# Patient Record
Sex: FEMALE | Race: WHITE | Employment: FULL TIME | URBAN - METROPOLITAN AREA
[De-identification: names, ages, dates, MRNs, and addresses within clinical notes are randomized per-mention and may not be internally consistent; named-entity substitution may affect disease eponyms.]

---

## 2016-11-08 LAB
ANTIBODY SCREEN, EXTERNAL: NEGATIVE
CHLAMYDIA, EXTERNAL: NEGATIVE
HBSAG, EXTERNAL: NEGATIVE
RUBELLA, EXTERNAL: NORMAL

## 2016-11-10 LAB
HIV, EXTERNAL: NON REACTIVE
T. PALLIDUM, EXTERNAL: NEGATIVE

## 2017-01-16 ENCOUNTER — ROUTINE PRENATAL (OUTPATIENT)
Dept: OBGYN CLINIC | Age: 37
End: 2017-01-16

## 2017-01-16 VITALS
DIASTOLIC BLOOD PRESSURE: 66 MMHG | HEART RATE: 89 BPM | HEIGHT: 64 IN | WEIGHT: 143 LBS | RESPIRATION RATE: 19 BRPM | BODY MASS INDEX: 24.41 KG/M2 | SYSTOLIC BLOOD PRESSURE: 106 MMHG

## 2017-01-16 DIAGNOSIS — Z34.82 SUPERVISION OF NORMAL INTRAUTERINE PREGNANCY IN MULTIGRAVIDA, SECOND TRIMESTER: Primary | ICD-10-CM

## 2017-02-14 ENCOUNTER — ROUTINE PRENATAL (OUTPATIENT)
Dept: OBGYN CLINIC | Age: 37
End: 2017-02-14

## 2017-02-14 VITALS
HEART RATE: 77 BPM | SYSTOLIC BLOOD PRESSURE: 117 MMHG | BODY MASS INDEX: 24.92 KG/M2 | RESPIRATION RATE: 18 BRPM | HEIGHT: 64 IN | DIASTOLIC BLOOD PRESSURE: 64 MMHG | WEIGHT: 146 LBS

## 2017-02-14 DIAGNOSIS — Z34.82 SUPERVISION OF NORMAL INTRAUTERINE PREGNANCY IN MULTIGRAVIDA, SECOND TRIMESTER: Primary | ICD-10-CM

## 2017-02-14 NOTE — PATIENT INSTRUCTIONS
Weeks 22 to 26 of Your Pregnancy: Care Instructions  Your Care Instructions    As you enter your 7th month of pregnancy at week 26, your baby's lungs are growing stronger and getting ready to breathe. You may notice that your baby responds to the sound of your or your partner's voice. You may also notice that your baby does less turning and twisting and more squirming or jerking. Jerking often means that your baby has the hiccups. Hiccups are perfectly normal and are only temporary. You may want to think about attending a childbirth preparation class. This is also a good time to start thinking about whether you want to have pain medicine during labor. Most pregnant women are tested for gestational diabetes between weeks 25 and 28. Gestational diabetes occurs when your blood sugar level gets too high when you're pregnant. The test is important, because you can have gestational diabetes and not know it. But the condition can cause problems for your baby. Follow-up care is a key part of your treatment and safety. Be sure to make and go to all appointments, and call your doctor if you are having problems. It's also a good idea to know your test results and keep a list of the medicines you take. How can you care for yourself at home? Ease discomfort from your baby's kicking  · Change your position. Sometimes this will cause your baby to change position too. · Take a deep breath while you raise your arm over your head. Then breathe out while you drop your arm. Do Kegel exercises to prevent urine from leaking  · You can do Kegel exercises while you stand or sit. ¨ Squeeze the same muscles you would use to stop your urine. Your belly and thighs should not move. ¨ Hold the squeeze for 3 seconds, and then relax for 3 seconds. ¨ Start with 3 seconds. Then add 1 second each week until you are able to squeeze for 10 seconds. ¨ Repeat the exercise 10 to 15 times for each session.  Do three or more sessions each day.  Ease or reduce swelling in your feet, ankles, hands, and fingers  · If your fingers are puffy, take off your rings. · Do not eat high-salt foods, such as potato chips. · Prop up your feet on a stool or couch as much as possible. Sleep with pillows under your feet. · Do not stand for long periods of time or wear tight shoes. · Wear support stockings. Where can you learn more? Go to http://louise-amari.info/. Enter G264 in the search box to learn more about \"Weeks 22 to 26 of Your Pregnancy: Care Instructions. \"  Current as of: May 30, 2016  Content Version: 11.1  © 6362-3088 [employer], [employer]. Care instructions adapted under license by [employer] (which disclaims liability or warranty for this information). If you have questions about a medical condition or this instruction, always ask your healthcare professional. [place] [address] any warranty or liability for your use of this information.

## 2017-02-14 NOTE — PROGRESS NOTES
Patient tolerating pregnancy well at this time. Denies n/v. Moderate ketones noted in urine at this time. Patient to have Glucola testing done with next visit. Handout provided.

## 2017-03-21 ENCOUNTER — TELEPHONE (OUTPATIENT)
Dept: OBGYN CLINIC | Age: 37
End: 2017-03-21

## 2017-03-21 NOTE — TELEPHONE ENCOUNTER
29 wks pt wanted to know if she can take the Tamiflu that was prescribed to her for the flu. This nurse advised patient that she can take the Tamiflu. Please advise.

## 2017-03-31 ENCOUNTER — ROUTINE PRENATAL (OUTPATIENT)
Dept: OBGYN CLINIC | Age: 37
End: 2017-03-31

## 2017-03-31 VITALS
SYSTOLIC BLOOD PRESSURE: 106 MMHG | HEIGHT: 64 IN | WEIGHT: 149.4 LBS | HEART RATE: 82 BPM | RESPIRATION RATE: 18 BRPM | DIASTOLIC BLOOD PRESSURE: 61 MMHG | BODY MASS INDEX: 25.51 KG/M2

## 2017-03-31 DIAGNOSIS — Z23 ENCOUNTER FOR IMMUNIZATION: ICD-10-CM

## 2017-03-31 DIAGNOSIS — Z3A.31 31 WEEKS GESTATION OF PREGNANCY: ICD-10-CM

## 2017-03-31 DIAGNOSIS — Z34.83 SUPERVISION OF NORMAL INTRAUTERINE PREGNANCY IN MULTIGRAVIDA, THIRD TRIMESTER: Primary | ICD-10-CM

## 2017-03-31 NOTE — PATIENT INSTRUCTIONS
Weeks 30 to 32 of Your Pregnancy: Care Instructions  Your Care Instructions    You have made it to the final months of your pregnancy. By now, your baby is really starting to look like a baby, with hair and plump skin. As you enter the final weeks of pregnancy, the reality of having a baby may start to set in. This is the time to settle on a name, get your household in order, set up a safe nursery, and find quality  if needed. Doing these things in advance will allow you to focus on caring for and enjoying your new baby. You may also want to have a tour of your hospital's labor and delivery unit to get a better idea of what to expect while you are in the hospital.  During these last months, it is very important to take good care of yourself and pay attention to what your body needs. If your doctor says it is okay for you to work, don't push yourself too hard. Use the tips provided in this care sheet to ease heartburn and care for varicose veins. If you haven't already had the Tdap shot during this pregnancy, talk to your doctor about getting it. It will help protect your  against pertussis infection. Follow-up care is a key part of your treatment and safety. Be sure to make and go to all appointments, and call your doctor if you are having problems. It's also a good idea to know your test results and keep a list of the medicines you take. How can you care for yourself at home? Pay attention to your baby's movements  · You should feel your baby move several times every day. · Your baby now turns less, and kicks and jabs more. · Your baby sleeps 20 to 45 minutes at a time and is more active at certain times of day. · If your doctor wants you to count your baby's kicks:  ¨ Empty your bladder, and lie on your side or relax in a comfortable chair. ¨ Write down your start time. ¨ Pay attention only to your baby's movements. Count any movement except hiccups.   ¨ After you have counted 10 movements, write down your stop time. ¨ Write down how many minutes it took for your baby to move 10 times. ¨ If an hour goes by and you have not recorded 10 movements, have something to eat or drink and then count for another hour. If you do not record 10 movements in either hour, call your doctor. Ease heartburn  · Eat small, frequent meals. · Do not eat chocolate, peppermint, or very spicy foods. Avoid drinks with caffeine, such as coffee, tea, and sodas. · Avoid bending over or lying down after meals. · Talk a short walk after you eat. · If heartburn is a problem at night, do not eat for 2 hours before bedtime. · Take antacids like Mylanta, Maalox, Rolaids, or Tums. Do not take antacids that have sodium bicarbonate. Care for varicose veins  · Varicose veins are blood vessels that stretch out with the extra blood during pregnancy. Your legs may ache or throb. Most varicose veins will go away after the birth. · Avoid standing for long periods of time. Sit with your legs crossed at the ankles, not the knees. · Sit with your feet propped up. · Avoid tight clothing or stockings. Wear support hose. · Exercise regularly. Try walking for at least 30 minutes a day. Where can you learn more? Go to http://louise-amari.info/. Enter L899 in the search box to learn more about \"Weeks 30 to 32 of Your Pregnancy: Care Instructions. \"  Current as of: May 30, 2016  Content Version: 11.2  © 3461-5846 [employer]. Care instructions adapted under license by [employer] (which disclaims liability or warranty for this information). If you have questions about a medical condition or this instruction, always ask your healthcare professional. [place] [address] any warranty or liability for your use of this information.

## 2017-03-31 NOTE — MR AVS SNAPSHOT
Visit Information Date & Time Provider Department Dept. Phone Encounter #  
 3/31/2017 10:30 AM MD Luis F Howe [phone] [id number] Follow-up Instructions Return in about 2 weeks (around 4/14/2017) for FOB. Follow-up and Disposition History Your Appointments 4/14/2017  3:20 PM  
OB VISIT with MD Luis F Howe ([facility]) Appt Note: 2wk fob [id number] [facility],[address] [facility] [phone]  
[facility] 31 [address] Upcoming Health Maintenance Date Due INFLUENZA AGE 9 TO ADULT 8/1/2016 PAP AKA CERVICAL CYTOLOGY 5/6/2018 Allergies as of 3/31/2017  Review Complete On: 3/31/2017 By: Emeli Greer MD  
  
 Severity Noted Reaction Type Reactions Iodine  09/02/2011   Side Effect Rash Shellfish Derived  04/21/2014    Rash Current Immunizations  Never Reviewed Name Date Influenza Vaccine 10/21/2014 Tdap 3/31/2017 11:35 AM, 11/4/2014 Not reviewed this visit You Were Diagnosed With   
  
 Codes Comments Supervision of normal intrauterine pregnancy in multigravida, third trimester    -  Primary ICD-10-CM: Z34.93 
ICD-9-CM: V22.1 31 weeks gestation of pregnancy     ICD-10-CM: Z3A.31 
ICD-9-CM: V22.2 Encounter for immunization     ICD-10-CM: M48 ICD-9-CM: V03.89 Vitals BP Pulse Resp Height(growth percentile) Weight(growth percentile) LMP  
 106/61 (BP 1 Location: Left arm, BP Patient Position: Sitting) 82 18 5' 3.5\" (1.613 m) 149 lb 6.4 oz (67.8 kg) 09/04/2016 (Exact Date) BMI OB Status Smoking Status 26.05 kg/m2 Pregnant Former Smoker BMI and BSA Data Body Mass Index Body Surface Area 26.05 kg/m 2 1.74 m 2 Preferred Pharmacy Pharmacy Name Phone [id number] [place], [address] [facility] [phone] Your Updated Medication List  
  
   
This list is accurate as of: 3/31/17  2:13 PM.  Always use your most recent med list. AMBULATORY BREAST PUMP As directed PRENATAL VITAMIN PO Take  by mouth. PROAIR HFA 90 mcg/actuation inhaler Generic drug:  albuterol TYLENOL PO Take  by mouth. We Performed the Following CBC WITH AUTOMATED DIFF [38922 CPT(R)] GLUCOSE, GESTATIONAL, 1 HR TOLERANCE [25845 CPT(R)] PA IMMUNIZ ADMIN,1 SINGLE/COMB VAC/TOXOID [id number] CPT(R)] TETANUS, DIPHTHERIA TOXOIDS AND ACELLULAR PERTUSSIS VACCINE (TDAP), IN INDIVIDS. >=7, IM [id number] CPT(R)] Follow-up Instructions Return in about 2 weeks (around 2017) for FOB. Patient Instructions Weeks 30 to 32 of Your Pregnancy: Care Instructions Your Care Instructions You have made it to the final months of your pregnancy. By now, your baby is really starting to look like a baby, with hair and plump skin. As you enter the final weeks of pregnancy, the reality of having a baby may start to set in. This is the time to settle on a name, get your household in order, set up a safe nursery, and find quality  if needed. Doing these things in advance will allow you to focus on caring for and enjoying your new baby. You may also want to have a tour of your hospital's labor and delivery unit to get a better idea of what to expect while you are in the hospital. 
During these last months, it is very important to take good care of yourself and pay attention to what your body needs. If your doctor says it is okay for you to work, don't push yourself too hard. Use the tips provided in this care sheet to ease heartburn and care for varicose veins. If you haven't already had the Tdap shot during this pregnancy, talk to your doctor about getting it. It will help protect your  against pertussis infection. Follow-up care is a key part of your treatment and safety. Be sure to make and go to all appointments, and call your doctor if you are having problems. It's also a good idea to know your test results and keep a list of the medicines you take. How can you care for yourself at home? Pay attention to your baby's movements · You should feel your baby move several times every day. · Your baby now turns less, and kicks and jabs more. · Your baby sleeps 20 to 45 minutes at a time and is more active at certain times of day. · If your doctor wants you to count your baby's kicks: 
¨ Empty your bladder, and lie on your side or relax in a comfortable chair. ¨ Write down your start time. ¨ Pay attention only to your baby's movements. Count any movement except hiccups. ¨ After you have counted 10 movements, write down your stop time. ¨ Write down how many minutes it took for your baby to move 10 times. ¨ If an hour goes by and you have not recorded 10 movements, have something to eat or drink and then count for another hour. If you do not record 10 movements in either hour, call your doctor. Ease heartburn · Eat small, frequent meals. · Do not eat chocolate, peppermint, or very spicy foods. Avoid drinks with caffeine, such as coffee, tea, and sodas. · Avoid bending over or lying down after meals. · Talk a short walk after you eat. · If heartburn is a problem at night, do not eat for 2 hours before bedtime. · Take antacids like Mylanta, Maalox, Rolaids, or Tums. Do not take antacids that have sodium bicarbonate. Care for varicose veins · Varicose veins are blood vessels that stretch out with the extra blood during pregnancy. Your legs may ache or throb. Most varicose veins will go away after the birth. · Avoid standing for long periods of time. Sit with your legs crossed at the ankles, not the knees. · Sit with your feet propped up. · Avoid tight clothing or stockings. Wear support hose. · Exercise regularly. Try walking for at least 30 minutes a day. Where can you learn more? Go to http://louise-amari.info/. Enter M432 in the search box to learn more about \"Weeks 30 to 32 of Your Pregnancy: Care Instructions. \" Current as of: May 30, 2016 Content Version: 11.2 © 7212-4557 FundRazr. Care instructions adapted under license by [employer] (which disclaims liability or warranty for this information). If you have questions about a medical condition or this instruction, always ask your healthcare professional. [address] any warranty or liability for your use of this information. Please provide this summary of care documentation to your next provider. Your primary care clinician is listed as NOT ON FILE. If you have any questions after today's visit, please call [phone].

## 2017-03-31 NOTE — PROGRESS NOTES
Patient in office for routine OB visit, would like to get TDAP vaccine. Informed consent signed at this time. This writer administered TDAP vaccine to patient, in her left deltoid. Writer expressed to patient that she may have some site soreness. Patient expresses understanding at this time.

## 2017-04-01 LAB
BASOPHILS # BLD AUTO: 0 X10E3/UL (ref 0–0.2)
BASOPHILS NFR BLD AUTO: 0 %
EOSINOPHIL # BLD AUTO: 0.1 X10E3/UL (ref 0–0.4)
EOSINOPHIL NFR BLD AUTO: 1 %
ERYTHROCYTE [DISTWIDTH] IN BLOOD BY AUTOMATED COUNT: 13.4 % (ref 12.3–15.4)
GLUCOSE 1H P 50 G GLC PO SERPL-MCNC: 161 MG/DL (ref 65–139)
HCT VFR BLD AUTO: 34.6 % (ref 34–46.6)
HGB BLD-MCNC: 11.7 G/DL (ref 11.1–15.9)
IMM GRANULOCYTES # BLD: 0.1 X10E3/UL (ref 0–0.1)
IMM GRANULOCYTES NFR BLD: 1 %
LYMPHOCYTES # BLD AUTO: 1.4 X10E3/UL (ref 0.7–3.1)
LYMPHOCYTES NFR BLD AUTO: 14 %
MCH RBC QN AUTO: 30.4 PG (ref 26.6–33)
MCHC RBC AUTO-ENTMCNC: 33.8 G/DL (ref 31.5–35.7)
MCV RBC AUTO: 90 FL (ref 79–97)
MONOCYTES # BLD AUTO: 0.4 X10E3/UL (ref 0.1–0.9)
MONOCYTES NFR BLD AUTO: 4 %
NEUTROPHILS # BLD AUTO: 8.6 X10E3/UL (ref 1.4–7)
NEUTROPHILS NFR BLD AUTO: 80 %
PLATELET # BLD AUTO: 155 X10E3/UL (ref 150–379)
RBC # BLD AUTO: 3.85 X10E6/UL (ref 3.77–5.28)
WBC # BLD AUTO: 10.6 X10E3/UL (ref 3.4–10.8)

## 2017-04-04 NOTE — PROGRESS NOTES
Patient advised of MD reviewed labs and recommendations. Patient placed on the schedule for 4/7/17 at 8:30am for the 3 hour gtt. Patient provided with instructions and verbalized understanding.

## 2017-04-07 ENCOUNTER — LAB ONLY (OUTPATIENT)
Dept: OBGYN CLINIC | Age: 37
End: 2017-04-07

## 2017-04-07 DIAGNOSIS — R73.09 ABNORMAL GLUCOSE TOLERANCE TEST: Primary | ICD-10-CM

## 2017-04-07 DIAGNOSIS — Z3A.32 32 WEEKS GESTATION OF PREGNANCY: ICD-10-CM

## 2017-04-08 LAB
GLUCOSE 1H P 100 G GLC PO SERPL-MCNC: 209 MG/DL (ref 65–179)
GLUCOSE 2H P 100 G GLC PO SERPL-MCNC: 181 MG/DL (ref 65–154)
GLUCOSE 3H P 100 G GLC PO SERPL-MCNC: 121 MG/DL (ref 65–139)
GLUCOSE P FAST SERPL-MCNC: 86 MG/DL (ref 65–94)
NOTE:, 102047: ABNORMAL

## 2017-04-13 ENCOUNTER — TELEPHONE (OUTPATIENT)
Dept: OBGYN CLINIC | Age: 37
End: 2017-04-13

## 2017-04-13 NOTE — TELEPHONE ENCOUNTER
Returned call to patient, no answer on work #. Called cell and left detailed message re: DTC and endocrinology. Advised to call office if further questions.

## 2017-04-13 NOTE — TELEPHONE ENCOUNTER
Result Notes   Notes Recorded by Brock Galarza MD on 4/9/2017 at 8:40 PM  Three hour GTT is abnormal. She needs to see Endocrine and DTC.

## 2017-04-13 NOTE — TELEPHONE ENCOUNTER
Patient aware of 3hr gtt results and the need to see Endocrine[facility]. Patient aware that a referral will be put into the system and is requesting a return call back from Reggie Calhoun. Please contact patient at [phone] ext.  11 [facility]

## 2017-04-19 ENCOUNTER — ROUTINE PRENATAL (OUTPATIENT)
Dept: OBGYN CLINIC | Age: 37
End: 2017-04-19

## 2017-04-19 VITALS
HEART RATE: 99 BPM | SYSTOLIC BLOOD PRESSURE: 109 MMHG | BODY MASS INDEX: 26.46 KG/M2 | DIASTOLIC BLOOD PRESSURE: 63 MMHG | HEIGHT: 64 IN | WEIGHT: 155 LBS | RESPIRATION RATE: 19 BRPM

## 2017-04-19 DIAGNOSIS — O24.419 GESTATIONAL DIABETES MELLITUS (GDM) IN THIRD TRIMESTER, GESTATIONAL DIABETES METHOD OF CONTROL UNSPECIFIED: Primary | ICD-10-CM

## 2017-04-21 ENCOUNTER — TELEPHONE (OUTPATIENT)
Dept: OBGYN CLINIC | Age: 37
End: 2017-04-21

## 2017-04-21 ENCOUNTER — HOSPITAL ENCOUNTER (OUTPATIENT)
Dept: DIABETES SERVICES | Age: 37
Discharge: HOME | End: 2017-04-21
Payer: COMMERCIAL

## 2017-04-21 DIAGNOSIS — O24.410 DIET CONTROLLED GESTATIONAL DIABETES MELLITUS (GDM), ANTEPARTUM: ICD-10-CM

## 2017-04-21 PROCEDURE — G0108 DIAB MANAGE TRN  PER INDIV: HCPCS | Performed by: DIETITIAN, REGISTERED

## 2017-04-21 RX ORDER — LANCETS
EACH MISCELLANEOUS
Qty: 1 PACKAGE | Refills: 4 | Status: SHIPPED | OUTPATIENT
Start: 2017-04-21 | End: 2017-05-28

## 2017-04-21 NOTE — DIABETES MGMT
04/21/17      Thank you for your kind referral. Your patient, Siddhartha Hinkle attended a gestational diabetes education session at [address] [place] [address] where the following topics were covered today:    *Describing diabetes disease process and treatment options   *Incorporating nutrition management into lifestyle   *Monitoring blood glucose and other parameters and interpreting and using the results for self-management decision   making   *Preventing, detecting and treating acute complications   * Incorporating physical activity into lifestyle   * Using medication(s) safely and for maximum therapeutic benefit   * Develop personal strategies to promote health and behavior change  * States relationship of blood glucose control in pregnancy and outcomes for mother and baby    Data from this visit:  Weight:4/21/2017 154.6 #   Blood Glucose:4/21/2017 1 Hr Post-Lunch 132  Meter given: Accu chek Eunice Plus    Goal 1: Make better food choices - Discontinue all sweet beverages including fruit juice    Your patient also chose the following way to continue to be healthy and prevent Type 2 Diabetes in the future after their pregnancy:   Try new recipes/cook at home more often    Your patient will have a follow up appointment in one week. Their next visit is scheduled for 4/27/2017      We look forward to assisting your patient in meeting their self-management goals.  If you have any questions, please do not hesitate to call the [facility] at [phone]    Sincerely, Klaudia Kumar, [address], [address] [place] [address] Manuel San  Phone: [phone] Fax: [phone]

## 2017-04-21 NOTE — TELEPHONE ENCOUNTER
Patient calling, just left diabetes treatment center and needs a rx for test strips and lancets. Patient aware that rx's were sent in to pharmacy of choice.

## 2017-04-27 ENCOUNTER — HOSPITAL ENCOUNTER (OUTPATIENT)
Dept: DIABETES SERVICES | Age: 37
Discharge: HOME | End: 2017-04-27
Payer: COMMERCIAL

## 2017-04-27 DIAGNOSIS — O24.410 DIET CONTROLLED GESTATIONAL DIABETES MELLITUS (GDM), ANTEPARTUM: ICD-10-CM

## 2017-04-27 PROCEDURE — G0108 DIAB MANAGE TRN  PER INDIV: HCPCS

## 2017-04-27 NOTE — DIABETES MGMT
04/27/17    Thank you for your kind referral. Your patient, Dick Rivers attended a follow up gestational diabetes education session at [address] [place] [address] where the following topics were covered today:    *Incorporating nutrition management into lifestyle   *Monitoring blood glucose and other parameters and interpreting and using the results for self-management decision making   * Incorporating physical activity into lifestyle   * Using medication(s) safely and for maximum therapeutic benefit   * Develop personal strategies to promote health and behavior change  * States pros and cons of breastfeeding  * Develop personal strategies to prevent Type 2 Diabetes in the future    Data from this visit:   Weight: 4/21/2017 154.6 #, 4/27/2017 150.4 #    Pt achieved her goal set at first session: Goal 1: Make better food choices - Discontinue all sweet beverages including fruit juice    Comments: Pt doing really well with meal plan. She will be seeing the endocrinologist again next week. She is planning on breastfeeding again for at least 1 year and is familiar with kcal needs. Your patient has been encouraged to follow up with the [employer] [employer] [facility] staff by phone or in the office 6-12 weeks post partum so we can assess weight loss, meal planning skills and activity levels post partum to help prevent Type 2 Diabetes in the future. We look forward to assisting your patient in meeting their self-management goals. If you have any questions, please do not hesitate to call the [employer] [facility] at [phone]    Sincerely,   Roxana Cruz.  [facility], [address] [place] [address], [place] [address]  Phone: [phone] Fax: [phone]

## 2017-05-05 ENCOUNTER — ROUTINE PRENATAL (OUTPATIENT)
Dept: OBGYN CLINIC | Age: 37
End: 2017-05-05

## 2017-05-05 VITALS
BODY MASS INDEX: 26.12 KG/M2 | HEART RATE: 70 BPM | RESPIRATION RATE: 19 BRPM | HEIGHT: 64 IN | WEIGHT: 153 LBS | SYSTOLIC BLOOD PRESSURE: 106 MMHG | DIASTOLIC BLOOD PRESSURE: 64 MMHG

## 2017-05-05 DIAGNOSIS — Z3A.36 36 WEEKS GESTATION OF PREGNANCY: ICD-10-CM

## 2017-05-05 DIAGNOSIS — O24.419 GESTATIONAL DIABETES MELLITUS (GDM) IN THIRD TRIMESTER, GESTATIONAL DIABETES METHOD OF CONTROL UNSPECIFIED: Primary | ICD-10-CM

## 2017-05-07 LAB
GP B STREP DNA SPEC QL NAA+PROBE: NEGATIVE
GRBS, EXTERNAL: NEGATIVE

## 2017-05-19 ENCOUNTER — ROUTINE PRENATAL (OUTPATIENT)
Dept: OBGYN CLINIC | Age: 37
End: 2017-05-19

## 2017-05-19 VITALS
WEIGHT: 149 LBS | RESPIRATION RATE: 19 BRPM | HEIGHT: 64 IN | DIASTOLIC BLOOD PRESSURE: 70 MMHG | SYSTOLIC BLOOD PRESSURE: 123 MMHG | BODY MASS INDEX: 25.44 KG/M2 | HEART RATE: 91 BPM

## 2017-05-19 DIAGNOSIS — O24.419 GESTATIONAL DIABETES MELLITUS (GDM) IN THIRD TRIMESTER, GESTATIONAL DIABETES METHOD OF CONTROL UNSPECIFIED: Primary | ICD-10-CM

## 2017-05-19 NOTE — PROGRESS NOTES
LIMITED OB SCAN  A SINGLE VERTEX 38W0D IUP IS SEEN. FETAL CARDIAC MOTION OBSERVED. LIMITED ANATOMY WAS VISUALIZED AND APPEARS WNL. APPROPRIATE FETAL GROWTH IS SEEN. SIZE = DATES. HC < 5%. RIKKI AND PLACENTA APPEAR WNL.     Now using insulin BID  Will get NST

## 2017-05-19 NOTE — PROGRESS NOTES
NST procedure note    Kitty Middleton is a G4 [phone],  31 y.o. female [facility] whose Patient's last menstrual period was 09/04/2016 (exact date). was on  who presents for fetal non-stress test.    She is 38w0d and was monitored for 20 minutes and the FHR was reactive, with accelerations. NST Interpretation:    FHR baseline 150 bpm, variability mild, accelerations present, decelerations Absent. Uterine contractions were present,looks like uterine irritablity. Laura Matthew was informed of the NST results and her questions were answered.     Disposition:    [x] return to clinic as scheduled next week for repeat NST; induction planned 5/26   [] send to L&D for prolonged monitoring   [] admit to L&D fo

## 2017-05-23 ENCOUNTER — ROUTINE PRENATAL (OUTPATIENT)
Dept: OBGYN CLINIC | Age: 37
End: 2017-05-23

## 2017-05-23 VITALS
BODY MASS INDEX: 26.12 KG/M2 | SYSTOLIC BLOOD PRESSURE: 115 MMHG | HEART RATE: 80 BPM | RESPIRATION RATE: 19 BRPM | HEIGHT: 64 IN | WEIGHT: 153 LBS | DIASTOLIC BLOOD PRESSURE: 64 MMHG

## 2017-05-23 DIAGNOSIS — O24.414 INSULIN CONTROLLED GESTATIONAL DIABETES MELLITUS (GDM) IN THIRD TRIMESTER: Primary | ICD-10-CM

## 2017-05-23 NOTE — PROGRESS NOTES
NST procedure note    Kitty Middleton is a G4 [phone],  31 y.o. female [facility] whose Patient's last menstrual period was 09/04/2016 (exact date). was on  who presents for fetal non-stress test.    She is 38w4d and was monitored for 20 minutes and the FHR was reactive, with accelerations. NST Interpretation:    FHR baseline 150 bpm, variability moderate, accelerations present, decelerations Absent. Uterine contractions were absent. Laura Matthew was informed of the NST results and her questions were answered.     Disposition:    [] return to clinic as scheduled   [] send to L&D for prolonged monitoring   [x] admit to L&D for induction in 3 days

## 2017-05-26 ENCOUNTER — ANESTHESIA EVENT (OUTPATIENT)
Dept: LABOR AND DELIVERY | Age: 37
End: 2017-05-26
Payer: COMMERCIAL

## 2017-05-26 ENCOUNTER — HOSPITAL ENCOUNTER (INPATIENT)
Age: 37
LOS: 2 days | Discharge: HOME | End: 2017-05-28
Attending: OBSTETRICS & GYNECOLOGY | Admitting: OBSTETRICS & GYNECOLOGY
Payer: COMMERCIAL

## 2017-05-26 ENCOUNTER — ANESTHESIA (OUTPATIENT)
Dept: LABOR AND DELIVERY | Age: 37
End: 2017-05-26
Payer: COMMERCIAL

## 2017-05-26 LAB
BASOPHILS # BLD AUTO: 0 K/UL (ref 0–0.1)
BASOPHILS # BLD: 0 % (ref 0–1)
EOSINOPHIL # BLD: 0.1 K/UL (ref 0–0.4)
EOSINOPHIL NFR BLD: 1 % (ref 0–7)
ERYTHROCYTE [DISTWIDTH] IN BLOOD BY AUTOMATED COUNT: 13.7 % (ref 11.5–14.5)
GLUCOSE BLD STRIP.AUTO-MCNC: 87 MG/DL (ref 65–100)
GLUCOSE BLD STRIP.AUTO-MCNC: 92 MG/DL (ref 65–100)
GLUCOSE BLD STRIP.AUTO-MCNC: 98 MG/DL (ref 65–100)
HCT VFR BLD AUTO: 34.6 % (ref 35–47)
HGB BLD-MCNC: 12.2 G/DL (ref 11.5–16)
LYMPHOCYTES # BLD AUTO: 17 % (ref 12–49)
LYMPHOCYTES # BLD: 1.4 K/UL (ref 0.8–3.5)
MCH RBC QN AUTO: 31 PG (ref 26–34)
MCHC RBC AUTO-ENTMCNC: 35.3 G/DL (ref 30–36.5)
MCV RBC AUTO: 87.8 FL (ref 80–99)
MONOCYTES # BLD: 0.5 K/UL (ref 0–1)
MONOCYTES NFR BLD AUTO: 5 % (ref 5–13)
NEUTS SEG # BLD: 6.4 K/UL (ref 1.8–8)
NEUTS SEG NFR BLD AUTO: 77 % (ref 32–75)
PLATELET # BLD AUTO: 143 K/UL (ref 150–400)
RBC # BLD AUTO: 3.94 M/UL (ref 3.8–5.2)
SERVICE CMNT-IMP: NORMAL
WBC # BLD AUTO: 8.4 K/UL (ref 3.6–11)

## 2017-05-26 PROCEDURE — 82962 GLUCOSE BLOOD TEST: CPT

## 2017-05-26 PROCEDURE — 65270000029 HC RM PRIVATE

## 2017-05-26 PROCEDURE — 3E0R3CZ INTRODUCE REGIONAL ANESTH IN SPINAL CANAL, PERC: ICD-10-PCS | Performed by: ANESTHESIOLOGY

## 2017-05-26 PROCEDURE — 74011000250 HC RX REV CODE- 250

## 2017-05-26 PROCEDURE — 74011250636 HC RX REV CODE- 250/636: Performed by: OBSTETRICS & GYNECOLOGY

## 2017-05-26 PROCEDURE — 76060000078 HC EPIDURAL ANESTHESIA: Performed by: ANESTHESIOLOGY

## 2017-05-26 PROCEDURE — 10907ZC DRAINAGE OF AMNIOTIC FLUID, THERAPEUTIC FROM PRODUCTS OF CONCEPTION, VIA NATURAL OR ARTIFICIAL OPENING: ICD-10-PCS | Performed by: OBSTETRICS & GYNECOLOGY

## 2017-05-26 PROCEDURE — 77030018749 HC HK AMNIO DISP DERY -A

## 2017-05-26 PROCEDURE — 75410000003 HC RECOV DEL/VAG/CSECN EA 0.5 HR: Performed by: OBSTETRICS & GYNECOLOGY

## 2017-05-26 PROCEDURE — 74011250637 HC RX REV CODE- 250/637: Performed by: OBSTETRICS & GYNECOLOGY

## 2017-05-26 PROCEDURE — 77030034850

## 2017-05-26 PROCEDURE — 75410000000 HC DELIVERY VAGINAL/SINGLE: Performed by: OBSTETRICS & GYNECOLOGY

## 2017-05-26 PROCEDURE — 36415 COLL VENOUS BLD VENIPUNCTURE: CPT | Performed by: OBSTETRICS & GYNECOLOGY

## 2017-05-26 PROCEDURE — 85025 COMPLETE CBC W/AUTO DIFF WBC: CPT | Performed by: OBSTETRICS & GYNECOLOGY

## 2017-05-26 PROCEDURE — 77030014125 HC TY EPDRL BBMI -B: Performed by: ANESTHESIOLOGY

## 2017-05-26 PROCEDURE — 74011250636 HC RX REV CODE- 250/636: Performed by: ANESTHESIOLOGY

## 2017-05-26 PROCEDURE — 75410000002 HC LABOR FEE PER 1 HR: Performed by: OBSTETRICS & GYNECOLOGY

## 2017-05-26 RX ORDER — NALOXONE HYDROCHLORIDE 0.4 MG/ML
0.4 INJECTION, SOLUTION INTRAMUSCULAR; INTRAVENOUS; SUBCUTANEOUS AS NEEDED
Status: DISCONTINUED | OUTPATIENT
Start: 2017-05-26 | End: 2017-05-28 | Stop reason: HOSPADM

## 2017-05-26 RX ORDER — ONDANSETRON 2 MG/ML
4 INJECTION INTRAMUSCULAR; INTRAVENOUS
Status: DISCONTINUED | OUTPATIENT
Start: 2017-05-26 | End: 2017-05-28 | Stop reason: HOSPADM

## 2017-05-26 RX ORDER — FENTANYL/BUPIVACAINE/NS/PF 2-1250MCG
1-16 PREFILLED PUMP RESERVOIR EPIDURAL CONTINUOUS
Status: DISCONTINUED | OUTPATIENT
Start: 2017-05-26 | End: 2017-05-26

## 2017-05-26 RX ORDER — NALOXONE HYDROCHLORIDE 0.4 MG/ML
0.4 INJECTION, SOLUTION INTRAMUSCULAR; INTRAVENOUS; SUBCUTANEOUS AS NEEDED
Status: DISCONTINUED | OUTPATIENT
Start: 2017-05-26 | End: 2017-05-26 | Stop reason: SDUPTHER

## 2017-05-26 RX ORDER — HYDROMORPHONE HYDROCHLORIDE 1 MG/ML
1 INJECTION, SOLUTION INTRAMUSCULAR; INTRAVENOUS; SUBCUTANEOUS
Status: DISCONTINUED | OUTPATIENT
Start: 2017-05-26 | End: 2017-05-26 | Stop reason: SDUPTHER

## 2017-05-26 RX ORDER — NALOXONE HYDROCHLORIDE 0.4 MG/ML
0.4 INJECTION, SOLUTION INTRAMUSCULAR; INTRAVENOUS; SUBCUTANEOUS AS NEEDED
Status: DISCONTINUED | OUTPATIENT
Start: 2017-05-26 | End: 2017-05-26 | Stop reason: HOSPADM

## 2017-05-26 RX ORDER — NALOXONE HYDROCHLORIDE 0.4 MG/ML
0.4 INJECTION, SOLUTION INTRAMUSCULAR; INTRAVENOUS; SUBCUTANEOUS ONCE
Status: DISCONTINUED | OUTPATIENT
Start: 2017-05-26 | End: 2017-05-26 | Stop reason: HOSPADM

## 2017-05-26 RX ORDER — BUPIVACAINE HYDROCHLORIDE 2.5 MG/ML
INJECTION, SOLUTION EPIDURAL; INFILTRATION; INTRACAUDAL AS NEEDED
Status: DISCONTINUED | OUTPATIENT
Start: 2017-05-26 | End: 2017-05-26 | Stop reason: HOSPADM

## 2017-05-26 RX ORDER — DIPHENHYDRAMINE HCL 25 MG
25 CAPSULE ORAL
Status: DISCONTINUED | OUTPATIENT
Start: 2017-05-26 | End: 2017-05-26 | Stop reason: SDUPTHER

## 2017-05-26 RX ORDER — OXYTOCIN/RINGER'S LACTATE 20/1000 ML
125-500 PLASTIC BAG, INJECTION (ML) INTRAVENOUS AS NEEDED
Status: DISCONTINUED | OUTPATIENT
Start: 2017-05-26 | End: 2017-05-26 | Stop reason: SDUPTHER

## 2017-05-26 RX ORDER — ONDANSETRON 2 MG/ML
4 INJECTION INTRAMUSCULAR; INTRAVENOUS
Status: DISCONTINUED | OUTPATIENT
Start: 2017-05-26 | End: 2017-05-26 | Stop reason: SDUPTHER

## 2017-05-26 RX ORDER — HYDROMORPHONE HYDROCHLORIDE 1 MG/ML
1 INJECTION, SOLUTION INTRAMUSCULAR; INTRAVENOUS; SUBCUTANEOUS
Status: DISCONTINUED | OUTPATIENT
Start: 2017-05-26 | End: 2017-05-28 | Stop reason: HOSPADM

## 2017-05-26 RX ORDER — OXYTOCIN/RINGER'S LACTATE 20/1000 ML
125-500 PLASTIC BAG, INJECTION (ML) INTRAVENOUS AS NEEDED
Status: DISCONTINUED | OUTPATIENT
Start: 2017-05-26 | End: 2017-05-28 | Stop reason: HOSPADM

## 2017-05-26 RX ORDER — INSULIN LISPRO 100 [IU]/ML
8 INJECTION, SOLUTION INTRAVENOUS; SUBCUTANEOUS
COMMUNITY
End: 2017-05-28

## 2017-05-26 RX ORDER — SODIUM CHLORIDE 0.9 % (FLUSH) 0.9 %
5-10 SYRINGE (ML) INJECTION AS NEEDED
Status: DISCONTINUED | OUTPATIENT
Start: 2017-05-26 | End: 2017-05-28 | Stop reason: HOSPADM

## 2017-05-26 RX ORDER — INSULIN LISPRO 100 [IU]/ML
10 INJECTION, SOLUTION INTRAVENOUS; SUBCUTANEOUS EVERY EVENING
COMMUNITY
End: 2017-05-28

## 2017-05-26 RX ORDER — SWAB
1 SWAB, NON-MEDICATED MISCELLANEOUS DAILY
Status: DISCONTINUED | OUTPATIENT
Start: 2017-05-27 | End: 2017-05-28 | Stop reason: HOSPADM

## 2017-05-26 RX ORDER — ACETAMINOPHEN 325 MG/1
650 TABLET ORAL
Status: DISCONTINUED | OUTPATIENT
Start: 2017-05-26 | End: 2017-05-28 | Stop reason: HOSPADM

## 2017-05-26 RX ORDER — HYDROCORTISONE ACETATE PRAMOXINE HCL 2.5; 1 G/100G; G/100G
CREAM TOPICAL AS NEEDED
Status: DISCONTINUED | OUTPATIENT
Start: 2017-05-26 | End: 2017-05-28 | Stop reason: HOSPADM

## 2017-05-26 RX ORDER — IBUPROFEN 800 MG/1
800 TABLET ORAL EVERY 8 HOURS
Status: DISCONTINUED | OUTPATIENT
Start: 2017-05-26 | End: 2017-05-28 | Stop reason: HOSPADM

## 2017-05-26 RX ORDER — IBUPROFEN 600 MG/1
600 TABLET ORAL
Qty: 30 TAB | Refills: 2 | Status: SHIPPED | OUTPATIENT
Start: 2017-05-26 | End: 2019-08-02 | Stop reason: ALTCHOICE

## 2017-05-26 RX ORDER — IBUPROFEN 600 MG/1
600 TABLET ORAL
Status: DISCONTINUED | OUTPATIENT
Start: 2017-05-26 | End: 2017-05-28 | Stop reason: HOSPADM

## 2017-05-26 RX ORDER — SODIUM CHLORIDE, SODIUM LACTATE, POTASSIUM CHLORIDE, CALCIUM CHLORIDE 600; 310; 30; 20 MG/100ML; MG/100ML; MG/100ML; MG/100ML
125 INJECTION, SOLUTION INTRAVENOUS CONTINUOUS
Status: DISCONTINUED | OUTPATIENT
Start: 2017-05-26 | End: 2017-05-28 | Stop reason: HOSPADM

## 2017-05-26 RX ORDER — HYDROCODONE BITARTRATE AND ACETAMINOPHEN 7.5; 325 MG/1; MG/1
1 TABLET ORAL
Status: DISCONTINUED | OUTPATIENT
Start: 2017-05-26 | End: 2017-05-26 | Stop reason: SDUPTHER

## 2017-05-26 RX ORDER — SIMETHICONE 80 MG
80 TABLET,CHEWABLE ORAL
Status: DISCONTINUED | OUTPATIENT
Start: 2017-05-26 | End: 2017-05-28 | Stop reason: HOSPADM

## 2017-05-26 RX ORDER — HYDROCORTISONE ACETATE PRAMOXINE HCL 2.5; 1 G/100G; G/100G
CREAM TOPICAL AS NEEDED
Status: DISCONTINUED | OUTPATIENT
Start: 2017-05-26 | End: 2017-05-26 | Stop reason: SDUPTHER

## 2017-05-26 RX ORDER — INSULIN LISPRO 100 [IU]/ML
2 INJECTION, SOLUTION INTRAVENOUS; SUBCUTANEOUS
COMMUNITY
End: 2017-05-28

## 2017-05-26 RX ORDER — DOCUSATE SODIUM 100 MG/1
100 CAPSULE, LIQUID FILLED ORAL
Status: DISCONTINUED | OUTPATIENT
Start: 2017-05-26 | End: 2017-05-26 | Stop reason: SDUPTHER

## 2017-05-26 RX ORDER — SIMETHICONE 80 MG
80 TABLET,CHEWABLE ORAL
Status: DISCONTINUED | OUTPATIENT
Start: 2017-05-26 | End: 2017-05-26 | Stop reason: SDUPTHER

## 2017-05-26 RX ORDER — DOCUSATE SODIUM 100 MG/1
100 CAPSULE, LIQUID FILLED ORAL
Status: DISCONTINUED | OUTPATIENT
Start: 2017-05-26 | End: 2017-05-28 | Stop reason: HOSPADM

## 2017-05-26 RX ORDER — HYDROCODONE BITARTRATE AND ACETAMINOPHEN 7.5; 325 MG/1; MG/1
1 TABLET ORAL
Status: DISCONTINUED | OUTPATIENT
Start: 2017-05-26 | End: 2017-05-28 | Stop reason: HOSPADM

## 2017-05-26 RX ORDER — OXYTOCIN IN 5 % DEXTROSE 30/500 ML
1-25 PLASTIC BAG, INJECTION (ML) INTRAVENOUS
Status: DISCONTINUED | OUTPATIENT
Start: 2017-05-26 | End: 2017-05-28 | Stop reason: HOSPADM

## 2017-05-26 RX ORDER — OXYCODONE AND ACETAMINOPHEN 5; 325 MG/1; MG/1
1-2 TABLET ORAL
Qty: 24 TAB | Refills: 0 | Status: SHIPPED | OUTPATIENT
Start: 2017-05-26 | End: 2017-07-07

## 2017-05-26 RX ORDER — ZOLPIDEM TARTRATE 5 MG/1
5 TABLET ORAL
Status: DISCONTINUED | OUTPATIENT
Start: 2017-05-26 | End: 2017-05-28 | Stop reason: HOSPADM

## 2017-05-26 RX ORDER — ZOLPIDEM TARTRATE 5 MG/1
5 TABLET ORAL
Status: DISCONTINUED | OUTPATIENT
Start: 2017-05-26 | End: 2017-05-26 | Stop reason: SDUPTHER

## 2017-05-26 RX ORDER — DIPHENHYDRAMINE HCL 25 MG
25 CAPSULE ORAL
Status: DISCONTINUED | OUTPATIENT
Start: 2017-05-26 | End: 2017-05-28 | Stop reason: HOSPADM

## 2017-05-26 RX ADMIN — HYDROCODONE BITARTRATE AND ACETAMINOPHEN 1 TABLET: 7.5; 325 TABLET ORAL at 20:01

## 2017-05-26 RX ADMIN — BUPIVACAINE HYDROCHLORIDE 10 ML: 2.5 INJECTION, SOLUTION EPIDURAL; INFILTRATION; INTRACAUDAL at 09:55

## 2017-05-26 RX ADMIN — SODIUM CHLORIDE, SODIUM LACTATE, POTASSIUM CHLORIDE, AND CALCIUM CHLORIDE 125 ML/HR: 600; 310; 30; 20 INJECTION, SOLUTION INTRAVENOUS at 07:16

## 2017-05-26 RX ADMIN — IBUPROFEN 800 MG: 800 TABLET, FILM COATED ORAL at 22:58

## 2017-05-26 RX ADMIN — SODIUM CHLORIDE, SODIUM LACTATE, POTASSIUM CHLORIDE, AND CALCIUM CHLORIDE 500 ML: 600; 310; 30; 20 INJECTION, SOLUTION INTRAVENOUS at 09:57

## 2017-05-26 RX ADMIN — Medication 2 MILLI-UNITS/MIN: at 07:45

## 2017-05-26 RX ADMIN — SODIUM CHLORIDE, SODIUM LACTATE, POTASSIUM CHLORIDE, AND CALCIUM CHLORIDE 1000 ML: 600; 310; 30; 20 INJECTION, SOLUTION INTRAVENOUS at 08:45

## 2017-05-26 RX ADMIN — FENTANYL 0.2 MG/100ML-BUPIV 0.125%-NACL 0.9% EPIDURAL INJ 12 ML/HR: 2/0.125 SOLUTION at 09:47

## 2017-05-26 RX ADMIN — IBUPROFEN 800 MG: 800 TABLET, FILM COATED ORAL at 14:29

## 2017-05-26 NOTE — IP AVS SNAPSHOT
J Luis Watts [address] 
[phone] Patient: Joseph Bustamante MRN: [id number] WYE:4[birth year]37[birth year]289 You are allergic to the following Allergen Reactions Iodine Rash Shellfish Derived Rash Recent Documentation Height Weight Breastfeeding? BMI OB Status Smoking Status 1.6 m 68 kg Unknown 26.57 kg/m2 Recent pregnancy Former Smoker Unresulted Labs Order Current Status SAMPLE TO BLOOD BANK In process Emergency Contacts Name Discharge Info Relation Home Work Mobile JuanMatthew DISCHARGE CAREGIVER [3] Spouse [3] [phone] RodriguezFely oconnor  Parent [1] [phone] Matthew ALANIS  Spouse [3] [phone] About your hospitalization You were admitted on:  May 26, 2017 You last received care in the:  [facility] 3 [facility] You were discharged on:  May 28, 2017 Unit phone number:  [phone] Why you were hospitalized Your primary diagnosis was:  Not on File Your diagnoses also included:  Pregnancy Providers Seen During Your Hospitalizations Provider Role Specialty Primary office phone Maximino Gomez MD Attending Provider Obstetrics & Gynecology [phone] Your Primary Care Physician (PCP) Primary Care Physician Office Phone Office Fax UNKNOWN, PROVIDER ** None ** ** None ** Follow-up Information Follow up With Details Comments Contact Info Provider Unknown   Patient not available to ask Current Discharge Medication List  
  
START taking these medications Dose & Instructions Dispensing Information Comments Morning Noon Evening Bedtime  
 ibuprofen 600 mg tablet Commonly known as:  MOTRIN Your last dose was: Your next dose is:    
   
   
 Dose:  600 mg Take 1 Tab by mouth every six (6) hours as needed for Pain. Quantity:  30 Tab Refills:  2 oxyCODONE-acetaminophen 5-325 mg per tablet Commonly known as:  PERCOCET Your last dose was: Your next dose is:    
   
   
 Dose:  1-2 Tab Take 1-2 Tabs by mouth every four (4) hours as needed for Pain. Max Daily Amount: 12 Tabs. Quantity:  24 Tab Refills:  0 CONTINUE these medications which have NOT CHANGED Dose & Instructions Dispensing Information Comments Morning Noon Evening Bedtime AMBULATORY BREAST PUMP Your last dose was: Your next dose is: As directed Quantity:  1 Device Refills:  0 PRENATAL VITAMIN PO Your last dose was: Your next dose is: Take  by mouth. Refills:  0 PROAIR HFA 90 mcg/actuation inhaler Generic drug:  albuterol Your last dose was: Your next dose is:    
   
   
  Refills:  0  
     
   
   
   
  
 TYLENOL PO Your last dose was: Your next dose is: Take  by mouth. Refills:  0 STOP taking these medications   
 glucose blood VI test strips strip Commonly known as:  ACCU-CHEK ADRIA HumaLOG 100 unit/mL injection Generic drug:  insulin lispro Lancets Misc Where to Get Your Medications Information on where to get these meds will be given to you by the nurse or doctor. ! Ask your nurse or doctor about these medications  
  ibuprofen 600 mg tablet  
 oxyCODONE-acetaminophen 5-325 mg per tablet Discharge Instructions POST DELIVERY DISCHARGE INSTRUCTIONS [address] Name: Benjie Suarez YOB: 1980 General:  
 
Diet/Diet Restrictions: 
Try to drink eight 8-ounce glasses of fluid daily (water, juices); avoid excessive caffeine intake. Meals/snacks as desired which are high in fiber and carbohydrates and low in fat and cholesterol. Medications: Physical Activity / Restrictions / Safety:  
 
Avoid heavy lifting, no more that 10 lbs. for 2-3 weeks; No driving while taking narcotic pain medication. Post  patients should not drive until pain free. No intercourse for 4-6 weeks if you had a vaginal delivery and 6 weeks if you had a  Section. Always use contraception even if you are breastfeeding. No douching or tampon use. May resume exercise in 6 weeks. Discharge Instructions/Special Treatment/Home Care Needs:  
 
Continue prenatal vitamins. Continue to use squirt bottle with warm water on your episiotomy after each bathroom use until bleeding stops. If you had a  Section and if steri-strips were applied to your incision, remove them in 7 days. If they fall off before then it's okay. If you are sent home with staples still on the incision then call the office to make an appointment within the next week to remove them. Take stool softeners (Colace) daily when you first get home and then as needed for constipation. Take them for 3-4 weeks if you had a large vaginal tear. Call your doctor for the following:  
 
Fever over 101 degrees by mouth. Vaginal bleeding heavier than a normal menstrual period or clots larger than a golf ball. Red streaks or increased swelling of legs, painful red streaks on your breast. 
Painful urination, or increased pain, redness or discharge with your incision. Pain Management:  
 
Pain Management:  
Take Acetaminophen (Tylenol) or Ibuprofen (Advil, Motrin) as directed for pain. Take perscribed narcotic meds if over the counter meds are not adequately controlling your pain. Use a warm Sitz bath 3 times daily to relieve episiotomy or hemorrhoidal discomfort. A heating pad applied to your lower abdomen can help relieve  incision pain as well as uterine cramps from delivery. For hemorrhoidal discomfort use Tucks pads and Anusol cream/Preparation H as needed and directed. Follow-Up Care:  
 
Unless instructed otherwise, make an appointment for 6 weeks after delivery. Telephone number: [phone] Signed By: Cordelia Maher MD                                                                                                   Date: 5/26/2017 Time: 4:55 PM 
 
Discharge Orders None Health system Announcement We are excited to announce that we are making your provider's discharge notes available to you in [facility]. You will see these notes when they are completed and signed by the physician that discharged you from your recent hospital stay. If you have any questions or concerns about any information you see in [place], please call the Health Information Department where you were seen or reach out to your Primary Care Provider for more information about your plan of care. General Information Please provide this summary of care documentation to your next provider. Patient Signature:  ____________________________________________________________ Date:  ____________________________________________________________  
  
[facility] Provider Signature:  ____________________________________________________________ Date:  ____________________________________________________________

## 2017-05-26 NOTE — PROGRESS NOTES
Bedside and Verbal shift change report given to Beth Thapa RN (oncoming nurse) by Erik Hilliard RN (offgoing nurse). Report given with SBAR, Kardex, Intake/Output, MAR and Recent Results.

## 2017-05-26 NOTE — ANESTHESIA PROCEDURE NOTES
Epidural Block    Start time: 5/26/2017 9:45 AM  End time: 5/26/2017 10:02 AM  Performed by: Georgiana Macedo by: Cat Todd     Pre-Procedure  Indication: labor epidural    Preanesthetic Checklist: patient identified, risks and benefits discussed, anesthesia consent, timeout performed and anesthesia consent      Epidural:   Patient position:  Seated  Prep region:  Lumbar  Prep: Betadine    Location:  L3-4    Needle and Epidural Catheter:   Needle Type:  Tuohy  Needle Gauge:  17 G  Injection Technique:  Loss of resistance using air  Attempts:  1  Catheter Size:  18 G  Events: no paresthesia and negative aspiration test    Test Dose:  Lidocaine 1.5% w/ epi and negative    Assessment:   Catheter Secured:  Tegaderm and tape  Insertion:  Uncomplicated  Patient tolerance:  Patient tolerated the procedure well with no immediate complications

## 2017-05-26 NOTE — PROGRESS NOTES
0935: Patient up to sitting position for epidural placement. Patient requesting blood sugar check due to lightheadedness. BS 90s. Dr. Armani Frias in room at this time. Patient states she feels as though she is going to vomit. BP low with first check and repeat BP. Patient helped to a L side lying position and BP rechecked in R arm. Still low. LR fluid bolus started. BP rechecked in R arm with patient supine. Patient states she is starting to feel better. BP 90s/50s. Patient repositioned again to L side lying for epidural placement. Tolerated epidural placement well. Patient repositioned to supine with head slightly elevated. BP 90s/50s. Patient states she feels fine. Will monitor.

## 2017-05-26 NOTE — L&D DELIVERY NOTE
Delivery Note    Patient delivered by .  n/a pounds n/a ounces. Apgars 9 @ one minute and 9 @ five minutes. no episiotomy, scant tear, repaired with not repaired. Third stage normal. Placenta delivered spontaneoulsy. EBL 200cc. Uterus and vagina clear of sponges.  TBLFI

## 2017-05-26 NOTE — H&P
History & Physical    Name: Mckayla Ontiveros MRN: [id number]  SSN: [id number]    YOB: 1980  Age: 31 y.o. Sex: female        Subjective:     Estimated Date of Delivery: 17  OB History      Para Term  AB TAB SAB Ectopic Multiple Living    5 2 1 0 1 0 0 1 0 2          Ms. Shweta Meyer is admitted with pregnancy at 39w0d for induction of labor. Prenatal course was complicated by gestational diabetes on insulin. Please see prenatal records for details. Past Medical History:   Diagnosis Date    Asthma     proair used as needed    Gestational diabetes     History of ectopic pregnancy      Past Surgical History:   Procedure Laterality Date    HX RIGHT SALPINGECTOMY       @  Social History     Social History    Marital status:      Spouse name: N/A    Number of children: N/A    Years of education: N/A     Occupational History    Not on file. Social History Main Topics    Smoking status: Former Smoker     Packs/day: 0.50     Types: Cigarettes    Smokeless tobacco: Never Used    Alcohol use Yes      Comment: rarely    Drug use: No    Sexual activity: Yes     Partners: Male     Birth control/ protection: Condom     Other Topics Concern    Not on file     Social History Narrative     Family History   Problem Relation Age of Onset    Breast Cancer Paternal Grandmother        Allergies   Allergen Reactions    Iodine Rash    Shellfish Derived Rash     Prior to Admission medications    Medication Sig Start Date End Date Taking? Authorizing Provider   insulin lispro (HUMALOG) 100 unit/mL injection 8 Units by SubCUTAneous route Daily (before breakfast). Yes Historical Provider   insulin lispro (HUMALOG) 100 unit/mL injection 2 Units by SubCUTAneous route three (3) times daily (with meals). Yes Historical Provider   insulin lispro (HUMALOG) 100 unit/mL injection 10 Units by SubCUTAneous route every evening.    Yes Historical Provider   glucose blood VI test strips (ACCU-CHEK ADRIA) strip Check blood sugar level 4 times per day. 4/21/17  Yes Brad Hawkins MD   Lancets misc Soft Clix Lancets. Check blood sugar levels 4 times per day. 4/21/17  Yes Brad Hawkins MD   [facility] 90 mcg/actuation inhaler  11/15/14  Yes Historical Provider   PRENATAL VITS W-CA,FE,FA,<1MG, (PRENATAL VITAMIN PO) Take  by mouth. Yes Historical Provider   AMBULATORY BREAST PUMP As directed 2/14/17   Brad Hawkins MD   ACETAMINOPHEN (TYLENOL PO) Take  by mouth. Historical Provider        Review of Systems: A comprehensive review of systems was negative except for that written in the HPI. Objective:     Vitals:  Vitals:    05/26/17 0702 05/26/17 0707 05/26/17 0712 05/26/17 0713   BP:    109/62   Pulse:    76   Resp:    12   Temp:    97.5 °F (36.4 °C)   SpO2: 98% 99% 99%    Weight:       Height:            Physical Exam:  Chest: clear  Heart: RRR  Abdomen: Gravid  CX 50/1-2/-3  Membranes:  Intact  Fetal Heart Rate: Reactive    Prenatal Labs:   Lab Results   Component Value Date/Time    Rubella, External Immune 04/21/2014    GrBStrep, External Negative 11/18/2014    HBsAg, External Negative 04/21/2014    HIV, External Negative 04/21/2014    Gonorrhea, External Negative 04/21/2014    Chlamydia, External negative 04/21/2014        Assessment/Plan:     Plan: Admit for induction of labor for diabetes. GBS negative. See prenatal labs.     Signed By:  Brad Hawkins MD     May 26, 2017

## 2017-05-26 NOTE — IP AVS SNAPSHOT
Summary of Care Report The Summary of Care report has been created to help improve care coordination. Users with access to [employer] or [address] (Web-based application) may access additional patient information including the Discharge Summary. If you are not currently a [address] user and need more information, please call the number listed below in the [place] [address] section and ask to be connected with Medical Records. Facility Information Name Address Phone [address] [place] [phone] Patient Information Patient Name Sex  Jon Bazzi ([id number]) Female 1980 Discharge Information Admitting Provider Service Area Unit Sheeba Rouse MD / [phone] [address] [facility] 3 Mother Infant / [phone] Discharge Provider Discharge Date/Time Discharge Disposition Destination (none) 2017 (Pending) AHR (none) Patient Language Language ENGLISH [13] Hospital Problems as of 2017  Reviewed: 2017  4:23 PM by Sheeba Rouse MD  
  
  
  
 Class Noted - Resolved Last Modified POA Active Problems Pregnancy  2016 - Present 2017 by Sheeba Rouse MD Unknown Entered by Sheeba Rouse MD  
  Overview Addendum 2017  2:17 PM by Brennan Ashford  for [facility] Declines genetic testing Female Flu @ 29 weeks Tdap 3/31/17 GDM 
GBS is negative IOL 17. Pt notified. Instructions given. Non-Hospital Problems as of 2017  Reviewed: 2017  4:23 PM by Sheeba Rouse MD  
 None You are allergic to the following Allergen Reactions Iodine Rash Shellfish Derived Rash Current Discharge Medication List  
  
START taking these medications Dose & Instructions Dispensing Information Comments ibuprofen 600 mg tablet Commonly known as:  MOTRIN Dose:  600 mg Take 1 Tab by mouth every six (6) hours as needed for Pain. Quantity:  30 Tab Refills:  2  
   
 oxyCODONE-acetaminophen 5-325 mg per tablet Commonly known as:  PERCOCET Dose:  1-2 Tab Take 1-2 Tabs by mouth every four (4) hours as needed for Pain. Max Daily Amount: 12 Tabs. Quantity:  24 Tab Refills:  0 CONTINUE these medications which have NOT CHANGED Dose & Instructions Dispensing Information Comments AMBULATORY BREAST PUMP As directed Quantity:  1 Device Refills:  0 PRENATAL VITAMIN PO Take  by mouth. Refills:  0 PROAIR HFA 90 mcg/actuation inhaler Generic drug:  albuterol Refills:  0  
   
 TYLENOL PO Take  by mouth. Refills:  0 STOP taking these medications Comments  
 glucose blood VI test strips strip Commonly known as:  ACCU-CHEK ADRIA HumaLOG 100 unit/mL injection Generic drug:  insulin lispro Lancets Misc Current Immunizations Name Date Influenza Vaccine 10/21/2014 Tdap 3/31/2017, 11/4/2014 Surgery Information ID Date/Time Status Primary Surgeon All Procedures Location [id number] 5/26/2017 Complete   [facility] - DO NOT SCHEDULE Follow-up Information Follow up With Details Comments Contact Info Provider Unknown   Patient not available to ask Discharge Instructions POST DELIVERY DISCHARGE INSTRUCTIONS [address] Name: Carley Haskins YOB: 1980 General:  
 
Diet/Diet Restrictions: 
Try to drink eight 8-ounce glasses of fluid daily (water, juices); avoid excessive caffeine intake. Meals/snacks as desired which are high in fiber and carbohydrates and low in fat and cholesterol. Medications:  
 
 
 
Physical Activity / Restrictions / Safety:  
 
Avoid heavy lifting, no more that 10 lbs. for 2-3 weeks;  No driving while taking narcotic pain medication. Post  patients should not drive until pain free. No intercourse for 4-6 weeks if you had a vaginal delivery and 6 weeks if you had a  Section. Always use contraception even if you are breastfeeding. No douching or tampon use. May resume exercise in 6 weeks. Discharge Instructions/Special Treatment/Home Care Needs:  
 
Continue prenatal vitamins. Continue to use squirt bottle with warm water on your episiotomy after each bathroom use until bleeding stops. If you had a  Section and if steri-strips were applied to your incision, remove them in 7 days. If they fall off before then it's okay. If you are sent home with staples still on the incision then call the office to make an appointment within the next week to remove them. Take stool softeners (Colace) daily when you first get home and then as needed for constipation. Take them for 3-4 weeks if you had a large vaginal tear. Call your doctor for the following:  
 
Fever over 101 degrees by mouth. Vaginal bleeding heavier than a normal menstrual period or clots larger than a golf ball. Red streaks or increased swelling of legs, painful red streaks on your breast. 
Painful urination, or increased pain, redness or discharge with your incision. Pain Management:  
 
Pain Management:  
Take Acetaminophen (Tylenol) or Ibuprofen (Advil, Motrin) as directed for pain. Take perscribed narcotic meds if over the counter meds are not adequately controlling your pain. Use a warm Sitz bath 3 times daily to relieve episiotomy or hemorrhoidal discomfort. A heating pad applied to your lower abdomen can help relieve  incision pain as well as uterine cramps from delivery. For hemorrhoidal discomfort use Tucks pads and Anusol cream/Preparation H as needed and directed. Follow-Up Care:  
 
Unless instructed otherwise, make an appointment for 6 weeks after delivery. Telephone number: [phone] Signed By: Daniel Freeman MD                                                                                                   Date: 5/26/2017 Time: 4:55 PM 
 
Chart Review Routing History Recipient Method Report Sent By Jax Freeman MD  
Phone: [phone] [facility] CUSTOM LAB REPORT Joseph Stoll [[id number]] 8/25/2014 12:51 PM 08/25/2014

## 2017-05-26 NOTE — PROGRESS NOTES
Bedside and Verbal shift change report given to Charbel Amaral RN (oncoming nurse) by Arnie Hurley RN (offgoing nurse). Report included the following information SBAR, Kardex, Intake/Output and Recent Results.

## 2017-05-26 NOTE — IP AVS SNAPSHOT
Current Discharge Medication List  
  
START taking these medications Dose & Instructions Dispensing Information Comments Morning Noon Evening Bedtime  
 ibuprofen 600 mg tablet Commonly known as:  MOTRIN Your last dose was: Your next dose is:    
   
   
 Dose:  600 mg Take 1 Tab by mouth every six (6) hours as needed for Pain. Quantity:  30 Tab Refills:  2  
     
   
   
   
  
 oxyCODONE-acetaminophen 5-325 mg per tablet Commonly known as:  PERCOCET Your last dose was: Your next dose is:    
   
   
 Dose:  1-2 Tab Take 1-2 Tabs by mouth every four (4) hours as needed for Pain. Max Daily Amount: 12 Tabs. Quantity:  24 Tab Refills:  0 CONTINUE these medications which have NOT CHANGED Dose & Instructions Dispensing Information Comments Morning Noon Evening Bedtime AMBULATORY BREAST PUMP Your last dose was: Your next dose is: As directed Quantity:  1 Device Refills:  0 PRENATAL VITAMIN PO Your last dose was: Your next dose is: Take  by mouth. Refills:  0 PROAIR HFA 90 mcg/actuation inhaler Generic drug:  albuterol Your last dose was: Your next dose is:    
   
   
  Refills:  0  
     
   
   
   
  
 TYLENOL PO Your last dose was: Your next dose is: Take  by mouth. Refills:  0 STOP taking these medications   
 glucose blood VI test strips strip Commonly known as:  ACCU-CHEK ADRIA HumaLOG 100 unit/mL injection Generic drug:  insulin lispro Lancets Misc Where to Get Your Medications Information on where to get these meds will be given to you by the nurse or doctor. ! Ask your nurse or doctor about these medications  
  ibuprofen 600 mg tablet oxyCODONE-acetaminophen 5-325 mg per tablet

## 2017-05-26 NOTE — DISCHARGE SUMMARY
Obstetrical Discharge Summary     Name: Rodney Gibbs MRN: [id number]  SSN: [id number]    YOB: 1980  Age: 31 y.o. Sex: female      Admit Date: 2017    Discharge Date: 2017     Admitting Physician: Ajay Cho MD     Attending Physician:  Ajay Cho MD     Admission Diagnoses: Induction;Pregnancy    Discharge Diagnoses:   Information for the patient's :  Gleda Escort Female [[id number]]   Delivery of a 5 lb 12.8 oz (2.63 kg) female infant via Vaginal, Spontaneous Delivery on 2017 at 12:23 PM  by . Apgars were 9 and 9. Additional Diagnoses:   Hospital Problems  Date Reviewed: 2017          Codes Class Noted POA    Pregnancy ICD-10-CM: Z33.1  ICD-9-CM: V22.2  2016 Unknown    Overview Addendum 2017  2:17 PM by [id number]  Declines genetic testing  Female   Flu @ 29 weeks  Tdap 3/31/17  GDM  GBS is negative  IOL 17. Pt notified. Instructions given. Lab Results   Component Value Date/Time    Rubella, External Immune 2016    GrBStrep, External Negative 2017       Hospital Course: Normal hospital course following the delivery. Condition on discharge: stable  Disposition: Home  Patient Instructions:   Current Discharge Medication List      START taking these medications    Details   oxyCODONE-acetaminophen (PERCOCET) 5-325 mg per tablet Take 1-2 Tabs by mouth every four (4) hours as needed for Pain. Max Daily Amount: 12 Tabs. Qty: 24 Tab, Refills: 0      ibuprofen (MOTRIN) 600 mg tablet Take 1 Tab by mouth every six (6) hours as needed for Pain. Qty: 30 Tab, Refills: 2         CONTINUE these medications which have NOT CHANGED    Details   PROAIR HFA 90 mcg/actuation inhaler       PRENATAL VITS W-CA,FE,FA,<1MG, (PRENATAL VITAMIN PO) Take  by mouth. AMBULATORY BREAST PUMP As directed  Qty: 1 Device, Refills: 0      ACETAMINOPHEN (TYLENOL PO) Take  by mouth.          STOP taking these medications insulin lispro (HUMALOG) 100 unit/mL injection Comments:   Reason for Stopping:         insulin lispro (HUMALOG) 100 unit/mL injection Comments:   Reason for Stopping:         insulin lispro (HUMALOG) 100 unit/mL injection Comments:   Reason for Stopping:         glucose blood VI test strips (ACCU-CHEK ADRIA) strip Comments:   Reason for Stopping:         Lancets misc Comments:   Reason for Stopping:               Reference my discharge instructions.     Follow-up Appointments   Procedures    FOLLOW UP VISIT Appointment in: 6 Weeks     Standing Status:   Standing     Number of Occurrences:   1     Order Specific Question:   Appointment in     Answer:   6 Weeks        Signed By:  Loreta Cervantes MD     May 26, 2017

## 2017-05-26 NOTE — ANESTHESIA PREPROCEDURE EVALUATION
Anesthetic History   No history of anesthetic complications            Review of Systems / Medical History  Patient summary reviewed, nursing notes reviewed and pertinent labs reviewed    Pulmonary            Asthma        Neuro/Psych   Within defined limits           Cardiovascular  Within defined limits                     GI/Hepatic/Renal  Within defined limits              Endo/Other    Diabetes         Other Findings              Physical Exam    Airway  Mallampati: II  TM Distance: 4 - 6 cm  Neck ROM: normal range of motion   Mouth opening: Normal     Cardiovascular    Rhythm: regular  Rate: normal         Dental  No notable dental hx       Pulmonary  Breath sounds clear to auscultation               Abdominal         Other Findings            Anesthetic Plan    ASA: 2  Anesthesia type: epidural            Anesthetic plan and risks discussed with: Patient

## 2017-05-26 NOTE — LACTATION NOTE
This note was copied from a baby's chart. Discussed with mother her plan for feeding. Reviewed the benefits of exclusive breast milk feeding during the hospital stay. Informed her of the risks of using formula to supplement in the first few days of life as well as the benefits of successful breast milk feeding; referred her to the Breastfeeding booklet about this information. She acknowledges understanding of information reviewed and states that it is her plan to BF her infant. Will support her choice and offer additional information as needed. Pt will successfully establish breastfeeding by feeding in response to early feeding cues   or wake every 3h, will obtain deep latch, and will keep log of feedings/output. Taught to BF at hunger cues and or q 2-3 hrs and to offer 10-20 drops of hand expressed colostrum at any non-feeds. Breast Assessment  Left Breast: Small , Medium  Left Nipple: Everted, Intact  Right Breast: Small , Medium  Right Nipple: Everted, Intact  Breast- Feeding Assessment  Attends Breast-Feeding Classes: No  Breast-Feeding Experience: Yes (1st child x 6 mo exclusive then blend fed for 1st yr, 2nd child x 18+ mo.)  Type/Quality: Attempted  Lactation Consultant Visits  Breast-Feedings: Attempted breast-feeding  Mother/Infant Observation  Mother Observation: Alignment, Breast comfortable, Recognizes feeding cues  Infant Observation: Feeding cues, Opens mouth  LATCH Documentation  Latch: Repeated attempts, hold nipple in mouth, stimulate to suck (Bonding S2S at breast, infant nursing in short bursts)  Audible Swallowing: A few with stimulation  Type of Nipple: Everted (after stimulation)  Comfort (Breast/Nipple): Soft/non-tender  Hold (Positioning): Full assist, teach one side, mother does other, staff holds  [facility] Score: 7   Insulin dependent gestational diabetic with this pregnancy.   Importance of S2S bonding at breast + offering 10-20 drops of hand expressed colostrum every 2 hours until bedtime tonight and then everry 3 hrs or until infant is effectively feeding at breast.    Hand Expression Education:  Mom taught how to manually hand express her colostrum. Emphasized the importance of providing infant with valuable colostrum as infant rests skin to skin at breast.  Aware to avoid extended periods of non-feeding. Aware to offer 10-20+ drops of colostrum every 2-3 hours until infant is latching and nursing effectively. Taught the rationale behind this low tech but highly effective evidence based practice. Biological Nurturing breastfeeding principles taught. How Biological Nurturing (BN)  promotes optimal breastfeeding (BF) sessions discussed. Mother encouraged to seek comfortable semi-reclining breastfeeding positions. Infant placed frontally along maternal contour. Primitive innate feeding reflexes/behaviors of the  discussed. BN tips and techniques shared; assisted with comfortable breastfeeding positioning.

## 2017-05-27 LAB
GLUCOSE BLD STRIP.AUTO-MCNC: 98 MG/DL (ref 65–100)
SERVICE CMNT-IMP: NORMAL

## 2017-05-27 PROCEDURE — 74011250637 HC RX REV CODE- 250/637: Performed by: OBSTETRICS & GYNECOLOGY

## 2017-05-27 PROCEDURE — 82962 GLUCOSE BLOOD TEST: CPT

## 2017-05-27 PROCEDURE — 77030018846 HC SOL IRR STRL H20 ICUM -A

## 2017-05-27 PROCEDURE — 65270000029 HC RM PRIVATE

## 2017-05-27 RX ORDER — HYDROCORTISONE 25 MG/G
CREAM TOPICAL
Status: DISCONTINUED | OUTPATIENT
Start: 2017-05-27 | End: 2017-05-28 | Stop reason: HOSPADM

## 2017-05-27 RX ADMIN — HYDROCODONE BITARTRATE AND ACETAMINOPHEN 1 TABLET: 7.5; 325 TABLET ORAL at 21:42

## 2017-05-27 RX ADMIN — HYDROCORTISONE: 25 CREAM TOPICAL at 18:15

## 2017-05-27 RX ADMIN — HYDROCODONE BITARTRATE AND ACETAMINOPHEN 1 TABLET: 7.5; 325 TABLET ORAL at 00:46

## 2017-05-27 RX ADMIN — HYDROCODONE BITARTRATE AND ACETAMINOPHEN 1 TABLET: 7.5; 325 TABLET ORAL at 16:03

## 2017-05-27 RX ADMIN — IBUPROFEN 800 MG: 800 TABLET, FILM COATED ORAL at 06:15

## 2017-05-27 RX ADMIN — HYDROCODONE BITARTRATE AND ACETAMINOPHEN 1 TABLET: 7.5; 325 TABLET ORAL at 06:15

## 2017-05-27 RX ADMIN — HYDROCODONE BITARTRATE AND ACETAMINOPHEN 1 TABLET: 7.5; 325 TABLET ORAL at 11:47

## 2017-05-27 RX ADMIN — IBUPROFEN 800 MG: 800 TABLET, FILM COATED ORAL at 23:45

## 2017-05-27 RX ADMIN — IBUPROFEN 800 MG: 800 TABLET, FILM COATED ORAL at 15:54

## 2017-05-27 RX ADMIN — HYDROCORTISONE: 25 CREAM TOPICAL at 21:48

## 2017-05-27 NOTE — LACTATION NOTE
This note was copied from a baby's chart. Pt will successfully establish breastfeeding by feeding in response to early feeding cues   or wake every 3h, will obtain deep latch, and will keep log of feedings/output. Taught to BF at hunger cues and or q 2-3 hrs and to offer 10-20 drops of hand expressed colostrum at any non-feeds. Breast Assessment  Left Breast: Small , Medium, Engorged  Left Nipple: Everted, Intact  Right Breast: Small , Medium  Right Nipple: Everted, Intact, Tender (Lanolin provided as mom requested)  Breast- Feeding Assessment  Attends Breast-Feeding Classes: No  Breast-Feeding Experience: Yes  Breast Trauma/Surgery: No  Type/Quality: Good (Effectively feeding at the left breast)  Lactation Consultant Visits  Breast-Feedings: Good   Mother/Infant Observation  Mother Observation: Alignment, Recognizes feeding cues, Lets baby end feeding, Nipple round on release  Infant Observation: Feeding cues, Latches nipple and aereolae, Lips flanged, lower, Lips flanged, upper, Opens mouth, Rhythmic suck, Relaxed after feeding  LATCH Documentation  Latch: Grasps breast, tongue down, lips flanged, rhythmic sucking  Audible Swallowing: Spontaneous and intermittent (24 hours old)  Type of Nipple: Everted (after stimulation)  Comfort (Breast/Nipple): Filling, red/small blisters/bruises, mild/mod discomfort  Hold (Positioning): No assist from staff, mother able to position/hold infant  LATCH Score: 9   Reviewed breastfeeding basics:  How milk is made and normal  breastfeeding behaviors discussed. Supply and demand,  stomach size, early feeding cues, skin to skin bonding with comfortable positioning and baby led latch-on reviewed. How to identify signs of successful breastfeeding sessions reviewed; education on assymetrical latch, signs of effective latching vs shallow, in-effective latching, normal  feeding frequency and duration and expected infant output discussed.   Normal course of breastfeeding discussed including the AAP's recommendation that children receive exclusive breast milk feedings for the first six months of life with breast milk feedings to continue through the first year of life and/or beyond as complimentary table foods are added. Breastfeeding Booklet and Warm line information provided with discussion. Discussed typical  weight loss and the importance of pediatrician appointment within 24-48 hours of discharge, at 2 weeks of life and normalcy of requesting pediatric weight checks as needed in between visits. Biological Nurturing breastfeeding principles taught. How Biological Nurturing (BN)  promotes optimal breastfeeding (BF) sessions discussed. Mother encouraged to seek comfortable semi-reclining breastfeeding positions. Infant placed frontally along maternal contour. Primitive innate feeding reflexes/behaviors of the  discussed. BN tips and techniques shared; assisted with comfortable breastfeeding positioning.

## 2017-05-27 NOTE — ROUTINE PROCESS
Bedside and Verbal shift change report given to [address] RN (oncoming nurse) by Kenya Sandoval RN (offgoing nurse). Report included the following information SBAR, Kardex, Intake/Output, MAR and Accordion.

## 2017-05-27 NOTE — PROGRESS NOTES
Bedside shift change report given to Violet Strong RN (oncoming nurse) by Xochilt Tejada RN (offgoing nurse). Report included the following information SBAR, Kardex, Intake/Output and MAR.

## 2017-05-27 NOTE — PROGRESS NOTES
PPD 1    The patient is without complaints. Passing minimal lochia and is voiding well.   Patient Vitals for the past 12 hrs:   Temp Pulse Resp BP   17 0742 98.1 °F (36.7 °C) 62 16 119/60   17 2205 98.1 °F (36.7 °C) 74 16 117/74       Abdomen: firm, non tender uterus below the umbilicus  Ext: no edema, no calf tenderness    A pos/ rub imm / rpr nr    Ass/Plan: PPD 1 s/p  doing well  -routine post partum care  -received TDAP during pregnancy  -baby girl/ breast feeding

## 2017-05-28 VITALS
RESPIRATION RATE: 18 BRPM | BODY MASS INDEX: 26.58 KG/M2 | WEIGHT: 150 LBS | OXYGEN SATURATION: 97 % | HEIGHT: 63 IN | HEART RATE: 63 BPM | SYSTOLIC BLOOD PRESSURE: 134 MMHG | TEMPERATURE: 97.8 F | DIASTOLIC BLOOD PRESSURE: 63 MMHG

## 2017-05-28 PROCEDURE — 74011250637 HC RX REV CODE- 250/637: Performed by: OBSTETRICS & GYNECOLOGY

## 2017-05-28 RX ADMIN — HYDROCODONE BITARTRATE AND ACETAMINOPHEN 1 TABLET: 7.5; 325 TABLET ORAL at 03:22

## 2017-05-28 RX ADMIN — IBUPROFEN 800 MG: 800 TABLET, FILM COATED ORAL at 08:37

## 2017-05-28 RX ADMIN — HYDROCODONE BITARTRATE AND ACETAMINOPHEN 1 TABLET: 7.5; 325 TABLET ORAL at 08:37

## 2017-05-28 RX ADMIN — HYDROCORTISONE: 25 CREAM TOPICAL at 08:36

## 2017-05-28 NOTE — PROGRESS NOTES
Post-Partum Day Number 2    Progress note post vaginal delivery    Patient doing well post-partum without significant complaint. Voiding without difficulty, normal lochia, positive flatus. Vitals:  Patient Vitals for the past 8 hrs:   BP Temp Pulse Resp   17 0653 134/63 97.8 °F (36.6 °C) 63 18     Temp (24hrs), Av.8 °F (36.6 °C), Min:97.4 °F (36.3 °C), Max:98.2 °F (36.8 °C)      Vital signs stable, afebrile. Exam:  Patient without distress. Abdomen soft, fundus firm,  nontender               Perineum with normal lochia noted. Lower extremities are negative for swelling, cords or tenderness. Labs: No results found for this or any previous visit (from the past 24 hour(s)). Assessment:     Status post: vaginal delivery. Doing well postpartum day 2. Plan:     Routine postpartum care. Plan discharge for today with follow up in our office in 6 weeks. See discharge summary.

## 2017-05-28 NOTE — PROGRESS NOTES
Mother discharged to home with parents. Discharge teaching done at bedside and all questions answered. Prescriptions given to patient. Vitals within normal limits.

## 2017-05-28 NOTE — DISCHARGE SUMMARY
Obstetrical Discharge Summary     Name: Mckayla Ontiveros MRN: [id number]  SSN: [id number]    YOB: 1980  Age: 31 y.o. Sex: female      Admit Date: 2017    Discharge Date: 2017     Admitting Physician: Lena Oconnor MD     Attending Physician:  Lena Oconnor MD     Admission Diagnoses: Induction;Pregnancy    Discharge Diagnoses:   Information for the patient's :  Gerard Eden Female [[id number]]   Delivery of a 5 lb 12.8 oz (2.63 kg) female infant via Vaginal, Spontaneous Delivery on 2017 at 12:23 PM  by . Apgars were 9 and 9. Additional Diagnoses:   Hospital Problems  Date Reviewed: 2017          Codes Class Noted POA    Pregnancy ICD-10-CM: Z33.1  ICD-9-CM: V22.2  2016 Unknown    Overview Addendum 2017  2:17 PM by [id number]  Declines genetic testing  Female   Flu @ 29 weeks  Tdap 3/31/17  GDM  GBS is negative  IOL 17. Pt notified. Instructions given. Lab Results   Component Value Date/Time    Rubella, External Immune 2016    GrBStrep, External Negative 2017       Immunization(s):   Immunization History   Administered Date(s) Administered    Influenza Vaccine 10/21/2014    Tdap 2014, 2017        Hospital Course: Normal hospital course following the delivery. The patient was released to her home in good condition. Patient Instructions:     Reference my discharge instructions.       Follow-up Appointments   Procedures    FOLLOW UP VISIT Appointment in: 6 Weeks     Standing Status:   Standing     Number of Occurrences:   1     Order Specific Question:   Appointment in     Answer:   6 Weeks        Signed By:  Nael Barrera MD     May 28, 2017

## 2017-05-28 NOTE — DISCHARGE INSTRUCTIONS
[facility]  Name: Rodney Gibbs  YOB: 1980      General:     Diet/Diet Restrictions:  Try to drink eight 8-ounce glasses of fluid daily (water, juices); avoid excessive caffeine intake. Meals/snacks as desired which are high in fiber and carbohydrates and low in fat and cholesterol. Medications:         Physical Activity / Restrictions / Safety:     Avoid heavy lifting, no more that 10 lbs. for 2-3 weeks; No driving while taking narcotic pain medication. Post  patients should not drive until pain free. No intercourse for 4-6 weeks if you had a vaginal delivery and 6 weeks if you had a  Section. Always use contraception even if you are breastfeeding. No douching or tampon use. May resume exercise in 6 weeks. Discharge Instructions/Special Treatment/Home Care Needs:     Continue prenatal vitamins. Continue to use squirt bottle with warm water on your episiotomy after each bathroom use until bleeding stops. If you had a  Section and if steri-strips were applied to your incision, remove them in 7 days. If they fall off before then it's okay. If you are sent home with staples still on the incision then call the office to make an appointment within the next week to remove them. Take stool softeners (Colace) daily when you first get home and then as needed for constipation. Take them for 3-4 weeks if you had a large vaginal tear. Call your doctor for the following:     Fever over 101 degrees by mouth. Vaginal bleeding heavier than a normal menstrual period or clots larger than a golf ball. Red streaks or increased swelling of legs, painful red streaks on your breast.  Painful urination, or increased pain, redness or discharge with your incision. Pain Management:     Pain Management:   Take Acetaminophen (Tylenol) or Ibuprofen (Advil, Motrin) as directed for pain.   Take perscribed narcotic meds if over the counter meds are not adequately controlling your pain. Use a warm Sitz bath 3 times daily to relieve episiotomy or hemorrhoidal discomfort. A heating pad applied to your lower abdomen can help relieve  incision pain as well as uterine cramps from delivery. For hemorrhoidal discomfort use Tucks pads and Anusol cream/Preparation H as needed and directed. Follow-Up Care:     Unless instructed otherwise, make an appointment for 6 weeks after delivery.   Telephone number: [phone]    Signed By: Pat Denson MD                                                                                                   Date: 2017 Time: 4:55 PM

## 2017-05-28 NOTE — ROUTINE PROCESS
Bedside and Verbal shift change report given to Nakul Cedillo RN (oncoming nurse) by Holli Trinh RN (offgoing nurse). Report included the following information SBAR, Kardex, Intake/Output, MAR and Accordion.

## 2017-07-07 ENCOUNTER — OFFICE VISIT (OUTPATIENT)
Dept: OBGYN CLINIC | Age: 37
End: 2017-07-07

## 2017-07-07 NOTE — PROGRESS NOTES
Postpartum evaluation    Eliud Saldivar is a 30 y.o. female who presents for a postpartum exam.     She is now six weeks post normal spontaneous vaginal delivery. Her baby is doing well. She has had no menses since delivery. She has had the following significant problems since her delivery: none    The patient is breast feeding without difficulty. The patient would like to use birth control. She is currently taking: no medications. Taking Prenatals    She is due for her next AE 05/2018. There were no vitals taken for this visit.     PHYSICAL EXAMINATION    Constitutional  · Appearance: well-nourished, well developed, alert, in no acute distress    HENT  · Head and Face: appears normal    Neck  · Inspection/Palpation: normal appearance, no masses or tenderness  · Lymph Nodes: no lymphadenopathy present  · Thyroid: gland size normal, nontender, no nodules or masses present on palpation    Gastrointestinal  · Abdominal Examination: abdomen non-tender to palpation, normal bowel sounds, no masses present  · Liver and spleen: no hepatomegaly present, spleen not palpable  · Hernias: no hernias identified    Genitourinary  · External Genitalia: normal appearance for age, no discharge present, no tenderness present, no inflammatory lesions present, no masses present, no atrophy present  · Vagina: normal vaginal vault without central or paravaginal defects, no discharge present, no inflammatory lesions present, no masses present  · Bladder: non-tender to palpation  · Urethra: appears normal  · Cervix: normal   · Uterus: normal size, shape and consistency  · Adnexa: no adnexal tenderness present, no adnexal masses present  · Perineum: perineum within normal limits, no evidence of trauma, no rashes or skin lesions present  · Anus: anus within normal limits, no hemorrhoids present  · Inguinal Lymph Nodes: no lymphadenopathy present    Skin  · General Inspection: no rash, no lesions identified    Neurologic/Psychiatric  · Mental Status:  · Orientation: grossly oriented to person, place and time  · Mood and Affect: mood normal, affect appropriate    Assessment:  Normal postpartum check    Plan:  RTO for AE. Rx for contraception: given info on IUDs and Nexplanon  She will see Endocrine in 2 weeks.

## 2017-07-21 ENCOUNTER — OFFICE VISIT (OUTPATIENT)
Dept: OBGYN CLINIC | Age: 37
End: 2017-07-21

## 2017-07-21 DIAGNOSIS — Z30.430 ENCOUNTER FOR IUD INSERTION: Primary | ICD-10-CM

## 2017-07-21 DIAGNOSIS — Z32.02 NEGATIVE PREGNANCY TEST: ICD-10-CM

## 2017-07-21 LAB
HCG URINE, QL. (POC): NEGATIVE
VALID INTERNAL CONTROL?: YES

## 2017-07-21 NOTE — PATIENT INSTRUCTIONS
Intrauterine Device (IUD) Insertion: Care Instructions  Your Care Instructions    The intrauterine device (IUD) is a very effective method of birth control. It is a small, plastic, T-shaped device that contains copper or hormones. The doctor inserts the IUD into your uterus. A plastic string tied to the end of the IUD hangs down through the cervix into the vagina. There are two types of IUDs. The copper IUD is effective for up to 10 years. The hormonal IUD is effective for either 3 years or 5 years, depending on which IUD is used. The hormonal IUD also reduces menstrual bleeding and cramping. Both types of IUD damage or kill the man's sperm. This means that the woman's egg does not join with the sperm. IUDs also change the lining of the uterus so that the egg does not lodge there. The IUD is most likely to work well for women who have been pregnant before. Some women who have never been pregnant have more trouble keeping the IUD in the uterus. They also may have more pain and cramping after insertion. Follow-up care is a key part of your treatment and safety. Be sure to make and go to all appointments, and call your doctor if you are having problems. It's also a good idea to know your test results and keep a list of the medicines you take. How can you care for yourself at home? · You may experience some mild cramping and light bleeding (spotting) for 1 or 2 days. Use a hot water bottle or a heating pad set on low on your belly for pain. · Take an over-the-counter pain medicine, such as acetaminophen (Tylenol), ibuprofen (Advil, Motrin), and naproxen (Aleve) if needed. Read and follow all instructions on the label. · Do not take two or more pain medicines at the same time unless the doctor told you to. Many pain medicines have acetaminophen, which is Tylenol. Too much acetaminophen (Tylenol) can be harmful. · Check the string of your IUD after every period.  To do this, insert a finger into your vagina and feel for the cervix, which is at the top of the vagina and feels harder than the rest of your vagina. You should be able to feel the thin, plastic string coming out of the opening of your cervix. If you cannot feel the string, use another form of birth control and make an appointment with your doctor to have the string checked. · If the IUD comes out, save it and call your doctor. Be sure to use another form of birth control while the IUD is out. · Use latex condoms to protect against sexually transmitted infections (STIs), such as gonorrhea and chlamydia. An IUD does not protect you from STIs. Having one sex partner (who does not have STIs and does not have sex with anyone else) is a good way to avoid STIs. When should you call for help? Call 911 anytime you think you may need emergency care. For example, call if:  · You passed out (lost consciousness). · You have sudden, severe pain in your belly or pelvis. Call your doctor now or seek immediate medical care if:  · You have new belly or pelvic pain. · You have severe vaginal bleeding. This means that you are soaking through your usual pads or tampons each hour for 2 or more hours. · You are dizzy or lightheaded, or you feel like you may faint. · You have a fever and pelvic pain or vaginal discharge. · You have pelvic pain that is getting worse. Watch closely for changes in your health, and be sure to contact your doctor if:  · You cannot feel the string, or the IUD comes out. · You feel sick to your stomach, or you vomit. · You think you may be pregnant. Where can you learn more? Go to http://louise-amari.info/. Enter D937 in the search box to learn more about \"Intrauterine Device (IUD) Insertion: Care Instructions. \"  Current as of: March 16, 2017  Content Version: 11.3  © 6436-1153 [employer].  Care instructions adapted under license by [employer] (which disclaims liability or warranty for this information). If you have questions about a medical condition or this instruction, always ask your healthcare professional. [place] [address] any warranty or liability for your use of this information.

## 2017-07-21 NOTE — PROGRESS NOTES
[facility]  OFFICE PROCEDURE PROGRESS NOTE        Chart reviewed for the following:   Loly NIX, have reviewed the History, Physical and updated the Allergic reactions for [address] performed immediately prior to start of procedure:   Loly NIX, have performed the following reviews on Marina Joya prior to the start of the procedure:            * Patient was identified by name and date of birth   * Agreement on procedure being performed was verified  * Risks and Benefits explained to the patient  * Consent was signed and verified     Time: 1:20pm    Date of procedure: 2017    Procedure performed by: Loren Tracy MD    Provider assisted by: Ramu Lopez MA    Patient assisted by: self    How tolerated by patient: tolerated the procedure well with no complications    Post Procedural Pain Scale: 0 - No Hurt    Comments: none      INSERTION----------------------------------------- Indications:  Marina Joya is a ,  30 y.o. female [facility] whose No LMP recorded. was on  and was normal in duration and amount of flow. who presents for insertion of an IUD. The risks, benefits and alternatives of IUD insertion were discussed in detail at last visit. She also has reviewed Mirena information. She has elected to proceed with the insertion today and she states she has no further questions. A urine pregnancy test was negative     Procedure: The pelvic exam revealed normal external genitalia. On bimanual exam the uterus was midposition and normal in size with no tenderness present. A speculum was inserted into the vagina and the cervix was visualized. The cervix was prepped with zephiran solution. The anterior lip of the cervix was grasped with a single toothed tenaculum. The uterus was sounded with a Mcgee sound to 8 centimeters. A Mirena was then inserted without difficulty. The string was cut to 3 centimeters. She experienced a moderate  amount of cramping. Post Procedure Status: She tolerated the procedure with moderate discomfort. The patient was observed for 15 minutes after the insertion. There were no complications. Patient was discharged in stable condition. The patient received Mirena lot number [id number]. Advised to RTO in 6 weeks for an US and to use condoms in the meantime.

## 2017-09-29 ENCOUNTER — OFFICE VISIT (OUTPATIENT)
Dept: OBGYN CLINIC | Age: 37
End: 2017-09-29

## 2017-09-29 VITALS — WEIGHT: 145 LBS | BODY MASS INDEX: 25.69 KG/M2 | RESPIRATION RATE: 19 BRPM | HEIGHT: 63 IN

## 2017-09-29 DIAGNOSIS — Z30.431 IUD CHECK UP: Primary | ICD-10-CM

## 2017-09-29 NOTE — PROGRESS NOTES
This is a follow-up visit for Traci Perez is a [phone],  30 y.o. female [facility] whose No LMP recorded. was on . She had an Mirena IUD placed six weeks ago. Since the IUD placement, the patient has not had any unusual complaints. She has had some mild non-menstrual bleeding. She describes having a light amount of blood-tinged discharge which has occurred off and on since insertion of the Mirena. She has not significant pelvic pain. She has had no fever. Associated signs and symptoms: she denies dyspareunia, expulsion, heavy bleeding, increased pain, fever, and pelvic pain. Ultrasound was done today and revealed appropriate placement of the IUD in the endometrial cavity. Past Medical History:   Diagnosis Date    Asthma     proair used as needed    Gestational diabetes     History of ectopic pregnancy      Past Surgical History:   Procedure Laterality Date    HX RIGHT SALPINGECTOMY       Social History     Occupational History    Not on file. Social History Main Topics    Smoking status: Former Smoker     Packs/day: 0.50     Types: Cigarettes    Smokeless tobacco: Never Used    Alcohol use Yes      Comment: rarely    Drug use: No    Sexual activity: Yes     Partners: Male     Birth control/ protection: Condom     Family History   Problem Relation Age of Onset    Breast Cancer Paternal Grandmother        Allergies   Allergen Reactions    Iodine Rash    Shellfish Derived Rash     Prior to Admission medications    Medication Sig Start Date End Date Taking? Authorizing Provider   ibuprofen (MOTRIN) 600 mg tablet Take 1 Tab by mouth every six (6) hours as needed for Pain. 5/26/17   James Holliday MD   AMBULATORY BREAST PUMP As directed 2/14/17   James Holliday MD   [facility] 90 mcg/actuation inhaler  11/15/14   Historical Provider   PRENATAL VITS W-CA,FE,FA,<1MG, (PRENATAL VITAMIN PO) Take  by mouth. Historical Provider   ACETAMINOPHEN (TYLENOL PO) Take  by mouth. Historical Provider        Review of Systems: History obtained from the patient  Constitutional: negative for weight loss, fever, night sweats  Breast: negative for breast lumps, nipple discharge, galactorrhea  GI: negative for change in bowel habits, abdominal pain, black or bloody stools  : negative for frequency, dysuria, hematuria, vaginal discharge  MSK: negative for back pain, joint pain, muscle pain  Skin: negative for itching, rash, hives  Psych: negative for anxiety, depression, change in mood      Objective:  Visit Vitals    Resp 19    Ht 5' 3\" (1.6 m)    Wt 145 lb (65.8 kg)    BMI 25.69 kg/m2       Physical Exam:   PHYSICAL EXAMINATION    Constitutional  · Appearance: well-nourished, well developed, alert, in no acute distress    Gastrointestinal  · Abdominal Examination: abdomen non-tender to palpation, normal bowel sounds, no masses present  · Liver and spleen: no hepatomegaly present, spleen not palpable  · Hernias: no hernias identified    Genitourinary  · External Genitalia: normal appearance for age, no discharge present, no tenderness present, no inflammatory lesions present, no masses present, no atrophy present  · Vagina: normal vaginal vault without central or paravaginal defects, no discharge present, no inflammatory lesions present, no masses present  · Bladder: non-tender to palpation  · Urethra: appears normal  · Cervix: normal with IUD string visible and appropriate length   · Uterus: normal size, shape and consistency  · Adnexa: no adnexal tenderness present, no adnexal masses present  · Perineum: perineum within normal limits, no evidence of trauma, no rashes or skin lesions present    Skin  · General Inspection: no rash, no lesions identified    Neurologic/Psychiatric  · Mental Status:  · Orientation: grossly oriented to person, place and time  · Mood and Affect: mood normal, affect appropriate    Assessment:   Normal exam post IUD placement    Plan:   F/U prn and for AE

## 2018-08-30 ENCOUNTER — OFFICE VISIT (OUTPATIENT)
Dept: OBGYN CLINIC | Age: 38
End: 2018-08-30

## 2018-08-30 VITALS
RESPIRATION RATE: 19 BRPM | BODY MASS INDEX: 23.04 KG/M2 | HEIGHT: 63 IN | DIASTOLIC BLOOD PRESSURE: 70 MMHG | WEIGHT: 130 LBS | HEART RATE: 83 BPM | SYSTOLIC BLOOD PRESSURE: 104 MMHG

## 2018-08-30 DIAGNOSIS — Z01.419 WELL FEMALE EXAM WITH ROUTINE GYNECOLOGICAL EXAM: Primary | ICD-10-CM

## 2018-08-30 DIAGNOSIS — Z11.51 SPECIAL SCREENING EXAMINATION FOR HUMAN PAPILLOMAVIRUS (HPV): ICD-10-CM

## 2018-08-30 NOTE — PROGRESS NOTES
Azael Salinas is a ,  41 y.o. female [facility] whose Patient's last menstrual period was 2018. was on 2018 who presents for her annual checkup. She is having no significant problems. With regard to the Gardisil vaccine, she is older than the FDA approved age to receive it. Menstrual status:    Her periods are minimal to nonexistent in flow. She denies dysmenorrhea. She reports no premenstrual symptoms. Contraception:    The current method of family planning is IUD. Sexual history:    She  reports that she currently engages in sexual activity and has had male partners. She reports using the following method of birth control/protection: IUD    Medical conditions:    Since her last annual GYN exam about three years ago, she has not the following changes in her health history: none. Pap and Mammogram History:    Her most recent Pap smear was negative and HPV negative obtained 3 year(s) ago. The patient has never had a mammogram.    The patient does not have a family history of breast cancer. Past Medical History:   Diagnosis Date    Asthma     proair used as needed    Gestational diabetes     History of ectopic pregnancy      Past Surgical History:   Procedure Laterality Date    HX RIGHT SALPINGECTOMY         Current Outpatient Prescriptions   Medication Sig Dispense Refill    ibuprofen (MOTRIN) 600 mg tablet Take 1 Tab by mouth every six (6) hours as needed for Pain. 30 Tab 2    PROAIR HFA 90 mcg/actuation inhaler       PRENATAL VITS W-CA,FE,FA,<1MG, (PRENATAL VITAMIN PO) Take  by mouth.  ACETAMINOPHEN (TYLENOL PO) Take  by mouth.  AMBULATORY BREAST PUMP As directed 1 Device 0     Allergies: Iodine and Shellfish derived   Social History     Social History    Marital status:      Spouse name: N/A    Number of children: N/A    Years of education: N/A     Occupational History    Not on file.      Social History Main Topics    Smoking status: Former Smoker     Packs/day: 0.50     Types: Cigarettes    Smokeless tobacco: Never Used    Alcohol use Yes      Comment: rarely    Drug use: No    Sexual activity: Yes     Partners: Male     Birth control/ protection: Condom     Other Topics Concern    Not on file     Social History Narrative     Tobacco History:  reports that she has quit smoking. Her smoking use included Cigarettes. She smoked 0.50 packs per day. She has never used smokeless tobacco.  Alcohol Abuse:  reports that she drinks alcohol. Drug Abuse:  reports that she does not use illicit drugs.     Patient Active Problem List   Diagnosis Code   (none) - all problems resolved or deleted       Review of Systems - History obtained from the patient  Constitutional: negative for weight loss, fever, night sweats  HEENT: negative for hearing loss, earache, congestion, snoring, sorethroat  CV: negative for chest pain, palpitations, edema  Resp: negative for cough, shortness of breath, wheezing  GI: negative for change in bowel habits, abdominal pain, black or bloody stools  : negative for frequency, dysuria, hematuria, vaginal discharge  MSK: negative for back pain, joint pain, muscle pain  Breast: negative for breast lumps, nipple discharge, galactorrhea  Skin :negative for itching, rash, hives  Neuro: negative for dizziness, headache, confusion, weakness  Psych: negative for anxiety, depression, change in mood  Heme/lymph: negative for bleeding, bruising, pallor    Physical Exam    Visit Vitals    /70 (BP 1 Location: Left arm, BP Patient Position: Sitting)    Pulse 83    Resp 19    Ht 5' 3\" (1.6 m)    Wt 130 lb (59 kg)    LMP 08/12/2018    BMI 23.03 kg/m2       Constitutional  · Appearance: well-nourished, well developed, alert, in no acute distress    HENT  · Head and Face: appears normal    Neck  · Inspection/Palpation: normal appearance, no masses or tenderness  · Lymph Nodes: no lymphadenopathy present    Chest  · Respiratory Effort: breathing normal    Breasts  · Inspection of Breasts: breasts symmetrical, no skin changes, no discharge present, nipple appearance normal, no skin retraction present  · Palpation of Breasts and Axillae: no masses present on palpation, no breast tenderness  · Axillary Lymph Nodes: no lymphadenopathy present    Gastrointestinal  · Abdominal Examination: abdomen non-tender to palpation, normal bowel sounds, no masses present  · Liver and spleen: no hepatomegaly present, spleen not palpable  · Hernias: no hernias identified    Genitourinary  · External Genitalia: normal appearance for age, no discharge present, no tenderness present, no inflammatory lesions present, no masses present, no atrophy present  · Vagina: normal vaginal vault without central or paravaginal defects, no discharge present, no inflammatory lesions present, no masses present  · Bladder: non-tender to palpation  · Urethra: appears normal  · Cervix: normal, IUD string seen   · Uterus: normal size, shape and consistency  · Adnexa: no adnexal tenderness present, no adnexal masses present  · Perineum: perineum within normal limits, no evidence of trauma, no rashes or skin lesions present  · Anus: anus within normal limits, no hemorrhoids present  · Inguinal Lymph Nodes: no lymphadenopathy present    Skin  · General Inspection: no rash, no lesions identified    Neurologic/Psychiatric  · Mental Status:  · Orientation: grossly oriented to person, place and time  · Mood and Affect: mood normal, affect appropriate    . Assessment:  Routine gynecologic examination  Her current medical status is satisfactory with no evidence of significant gynecologic issues.     Plan:  Counseled re: diet, exercise, healthy lifestyle  Return for yearly wellness visits

## 2018-09-04 LAB
CYTOLOGIST CVX/VAG CYTO: NORMAL
CYTOLOGY CVX/VAG DOC THIN PREP: NORMAL
CYTOLOGY HISTORY:: NORMAL
DX ICD CODE: NORMAL
HPV I/H RISK 1 DNA CVX QL PROBE+SIG AMP: NEGATIVE
Lab: NORMAL
Lab: NORMAL
OTHER STN SPEC: NORMAL
PATH REPORT.FINAL DX SPEC: NORMAL
STAT OF ADQ CVX/VAG CYTO-IMP: NORMAL

## 2018-09-04 NOTE — PROGRESS NOTES
Pap can't be read but HPV negative. She is low risk and can just get a repeat in one year instead 3 years.

## 2018-09-11 NOTE — PROGRESS NOTES
Patient returned call and has been advised of pap unable to be read and HPV negative. Will repeat pap in one year at next AE.

## 2019-08-02 ENCOUNTER — OFFICE VISIT (OUTPATIENT)
Dept: FAMILY MEDICINE CLINIC | Age: 39
End: 2019-08-02

## 2019-08-02 ENCOUNTER — HOSPITAL ENCOUNTER (OUTPATIENT)
Dept: GENERAL RADIOLOGY | Age: 39
Discharge: HOME | End: 2019-08-02
Attending: NURSE PRACTITIONER
Payer: COMMERCIAL

## 2019-08-02 VITALS
HEART RATE: 97 BPM | OXYGEN SATURATION: 98 % | HEIGHT: 63 IN | BODY MASS INDEX: 23.81 KG/M2 | WEIGHT: 134.38 LBS | SYSTOLIC BLOOD PRESSURE: 116 MMHG | RESPIRATION RATE: 18 BRPM | DIASTOLIC BLOOD PRESSURE: 79 MMHG | TEMPERATURE: 98.2 F

## 2019-08-02 DIAGNOSIS — M25.542 ARTHRALGIA OF BOTH HANDS: ICD-10-CM

## 2019-08-02 DIAGNOSIS — M25.532 PAIN IN BOTH WRISTS: ICD-10-CM

## 2019-08-02 DIAGNOSIS — M25.441 FINGER JOINT SWELLING, RIGHT: ICD-10-CM

## 2019-08-02 DIAGNOSIS — M25.541 ARTHRALGIA OF BOTH HANDS: ICD-10-CM

## 2019-08-02 DIAGNOSIS — J45.30 MILD PERSISTENT ASTHMA WITHOUT COMPLICATION: ICD-10-CM

## 2019-08-02 DIAGNOSIS — M25.531 PAIN IN BOTH WRISTS: ICD-10-CM

## 2019-08-02 DIAGNOSIS — M25.441 FINGER JOINT SWELLING, RIGHT: Primary | ICD-10-CM

## 2019-08-02 PROCEDURE — 73130 X-RAY EXAM OF HAND: CPT

## 2019-08-02 RX ORDER — ALBUTEROL SULFATE 90 UG/1
2 AEROSOL, METERED RESPIRATORY (INHALATION)
Qty: 1 INHALER | Refills: 5 | Status: SHIPPED | OUTPATIENT
Start: 2019-08-02 | End: 2020-04-17 | Stop reason: SDUPTHER

## 2019-08-02 RX ORDER — NAPROXEN 500 MG/1
500 TABLET ORAL
Qty: 45 TAB | Refills: 1 | Status: SHIPPED | OUTPATIENT
Start: 2019-08-02 | End: 2020-01-17

## 2019-08-02 RX ORDER — BISMUTH SUBSALICYLATE 262 MG
1 TABLET,CHEWABLE ORAL DAILY
COMMUNITY

## 2019-08-02 NOTE — PROGRESS NOTES
1. Have you been to the ER, urgent care clinic since your last visit? Hospitalized since your last visit? No     2. Have you seen or consulted any other health care providers outside of the [address] [place] [address] since your last visit? Include any pap smears or colon screening. No     Chief Complaint   Patient presents with    Establish Care    Hand Swelling    Extremity Weakness     bilateral hands    Joint Pain     bilateral knees     Patient here to establish care with a PCP. Patient reports bilateral hand swelling and weakness in hands for a few weeks. She works on computers all day. She is taking OTC glucosamine to see if that helps. Patient states RA runs in the family on both maternal and paternal side. She has joint pain and popping in bilateral knees and arms as well. She thinks it is worse over time and more frequent.

## 2019-08-02 NOTE — PATIENT INSTRUCTIONS
Controlling Your Asthma: Care Instructions  Your Care Instructions    Asthma is a long-term condition that affects your breathing. It causes the airways that lead to the lungs to swell. During an asthma attack, the airways swell and narrow. This makes it hard to breathe. You may wheeze or cough. If you have a bad attack, you may need emergency care. There are two things to do to treat asthma. · Control asthma over the long term. · Treat attacks when they occur. You and your doctor can make an asthma action plan. It tells you what medicines you need to take every day to control asthma symptoms and what to do if you have an asthma attack. Your asthma action plan can help prevent and treat attacks. When you keep your asthma under control, you can prevent severe attacks and lasting damage to your airways. You need to treat your asthma even when you are not having symptoms. Although asthma is a lifelong disease, treatment can help control it and help you stay healthy. Follow-up care is a key part of your treatment and safety. Be sure to make and go to all appointments, and call your doctor if you are having problems. It's also a good idea to know your test results and keep a list of the medicines you take. How can you care for yourself at home? To control asthma over the long term  Medicines  Controller medicines reduce swelling in your lungs. They also prevent asthma attacks. Take your controller medicine exactly as prescribed. Talk to your doctor if you have any problems with your medicine. · Inhaled corticosteroid is a common and effective controller medicine. Using it the right way can prevent or reduce most side effects. · Take your controller medicine every day, not just when you have symptoms. It helps prevent problems before they occur. · Your doctor may prescribe another medicine that you use along with the corticosteroid. This is often a long-acting bronchodilator.  Do not take this medicine by itself. Using a long-acting bronchodilator by itself can increase your risk of a severe or fatal asthma attack. · Do not take inhaled corticosteroids or long-acting bronchodilators to stop an asthma attack that has already started. They don't work fast enough to help. · Talk to your doctor before you use other medicines. Some medicines, such as aspirin, can cause asthma attacks in some people. Education  · Learn what triggers an asthma attack. Avoid these triggers when you can. Common triggers include colds, smoke, air pollution, dust, pollen, mold, pets, cockroaches, stress, and cold air. · Check yourself for asthma symptoms to know which step to follow in your action plan. Watch for things like being short of breath, having chest tightness, coughing, and wheezing. Also notice if symptoms wake you up at night or if you get tired quickly when you exercise. · If you have a peak flow meter, use it to check how well you are breathing. It can help you know when an asthma attack is going to occur. Then you can take medicine to prevent the asthma attack or make it less severe. · Do not smoke or allow others to smoke around you. Avoid smoky places. Smoking makes asthma worse. If you need help quitting, talk to your doctor about stop-smoking programs and medicines. These can increase your chances of quitting for good. · Avoid colds and the flu. Get a pneumococcal vaccine shot. If you have had one before, ask your doctor whether you need a second dose. Get a flu vaccine every year, as soon as it's available. If you must be around people with colds or the flu, wash your hands often. To treat attacks when they occur  Use your asthma action plan when you have an attack. Your quick-relief medicine will stop an asthma attack. It relaxes the muscles that get tight around the airways. If your doctor prescribed corticosteroid pills to use during an attack, take them as directed.  They may take hours to work, but they may shorten the attack and help you breathe better. · Albuterol is an effective quick-relief inhaler. · Take your quick-relief medicine exactly as prescribed. · Always bring your asthma medicine with you when you travel. · You may need to use quick-relief medicine before you exercise. · Call your doctor if you think you are having a problem with your medicine. When should you call for help? Call 911 anytime you think you may need emergency care. For example, call if:    · You are having severe trouble breathing.    Call your doctor now or seek immediate medical care if:    · Your symptoms do not get better after you have followed your asthma action plan.     · You cough up yellow, dark brown, or bloody mucus (sputum).    Watch closely for changes in your health, and be sure to contact your doctor if:    · Your coughing and wheezing get worse.     · You need to use your quick-relief medicine on more than 2 days a week (unless it is just for exercise).     · You need help figuring out what is triggering your asthma attacks. Where can you learn more? Go to http://louise-amari.info/. Enter C984 in the search box to learn more about \"Controlling Your Asthma: Care Instructions. \"  Current as of: September 5, 2018  Content Version: 12.1  © 3917-4645 [employer]. Care instructions adapted under license by [employer] (which disclaims liability or warranty for this information). If you have questions about a medical condition or this instruction, always ask your healthcare professional. [place] [address] any warranty or liability for your use of this information. Joint Pain: Care Instructions  Your Care Instructions    Many people have small aches and pains from overuse or injury to muscles and joints. Joint injuries often happen during sports or recreation, work tasks, or projects around the home.  An overuse injury can happen when you put too much stress on a joint or when you do an activity that stresses the joint over and over, such as using the computer or rowing a boat. You can take action at home to help your muscles and joints get better. You should feel better in 1 to 2 weeks, but it can take 3 months or more to heal completely. Follow-up care is a key part of your treatment and safety. Be sure to make and go to all appointments, and call your doctor if you are having problems. It's also a good idea to know your test results and keep a list of the medicines you take. How can you care for yourself at home? · Do not put weight on the injured joint for at least a day or two. · For the first day or two after an injury, do not take hot showers or baths, and do not use hot packs. The heat could make swelling worse. · Put ice or a cold pack on the sore joint for 10 to 20 minutes at a time. Try to do this every 1 to 2 hours for the next 3 days (when you are awake) or until the swelling goes down. Put a thin cloth between the ice and your skin. · Wrap the injury in an elastic bandage. Do not wrap it too tightly because this can cause more swelling. · Prop up the sore joint on a pillow when you ice it or anytime you sit or lie down during the next 3 days. Try to keep it above the level of your heart. This will help reduce swelling. · Take an over-the-counter pain medicine, such as acetaminophen (Tylenol), ibuprofen (Advil, Motrin), or naproxen (Aleve). Read and follow all instructions on the label. · After 1 or 2 days of rest, begin moving the joint gently. While the joint is still healing, you can begin to exercise using activities that do not strain or hurt the painful joint. When should you call for help? Call your doctor now or seek immediate medical care if:    · You have signs of infection, such as:  ? Increased pain, swelling, warmth, and redness. ? Red streaks leading from the joint.   ? A fever.    Watch closely for changes in your health, and be sure to contact your doctor if:    · Your movement or symptoms are not getting better after 1 to 2 weeks of home treatment. Where can you learn more? Go to http://louise-amari.info/. Enter P205 in the search box to learn more about \"Joint Pain: Care Instructions. \"  Current as of: September 20, 2018  Content Version: 12.1  © 7830-8388 [employer]. Care instructions adapted under license by [employer] (which disclaims liability or warranty for this information). If you have questions about a medical condition or this instruction, always ask your healthcare professional. [place] [address] any warranty or liability for your use of this information.

## 2019-08-03 LAB
ANA SER QL: NEGATIVE
ERYTHROCYTE [SEDIMENTATION RATE] IN BLOOD BY WESTERGREN METHOD: 13 MM/HR (ref 0–32)

## 2019-08-12 NOTE — PROGRESS NOTES
Edie Holter is a 42 y.o. female who presents with the following complaints:  Chief Complaint   Patient presents with    Establish Care    Hand Swelling    Extremity Weakness     bilateral hands    Joint Pain     bilateral knees       Subjective:    HPI:   presensts to establish care and for evaluation of joint pain and swelling. C/o increasing pain, swelling, and stiffness in bilateral hands and wrists for the past 6+ months. Symptoms are worsening over time. Difficult to  things secondary to the pain. Has not noted redness or increased joint warmth. No fever or chills. Also notes pain and popping in bilateral knees. 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 She is concerned about RA, reports multiple family members with RA on both sides of her family. Has tried glucosamine without much relief. Pain/stiffness are worse in the morning. Rest sometimes alleviates the symptoms. Symptoms are worse on the right side. Hx of asthma; she considers her symptoms mild, but reports use of rescue at least once daily most days over the past few months. She is not currently on ICS or other controller/maintennce treatment. No night time symptoms. Nonsmoker.      Pertinent PMH/FH/SH:  Past Medical History:   Diagnosis Date    Asthma     proair used as needed    Gestational diabetes     Gestational diabetes     History of ectopic pregnancy      Past Surgical History:   Procedure Laterality Date    HX RIGHT SALPINGECTOMY       Family History   Problem Relation Age of Onset    Breast Cancer Paternal Grandmother     Arthritis Paternal Grandmother     Arthritis-rheumatoid Maternal Aunt      Social History     Socioeconomic History    Marital status:      Spouse name: Not on file    Number of children: Not on file    Years of education: Not on file    Highest education level: Not on file   Tobacco Use    Smoking status: Former Smoker     Packs/day: 0.50     Types: Cigarettes    Smokeless tobacco: Never Used   Substance and Sexual Activity    Alcohol use: Yes     Comment: rarely    Drug use: No    Sexual activity: Yes     Partners: Male     Birth control/protection: Condom     Advanced Directives: N      There are no active problems to display for this patient. Nurse notes were reviewed and are correct  Review of Systems - negative except as listed above in the HPI    Objective:     Vitals:    08/02/19 1414   BP: 116/79   Pulse: 97   Resp: 18   Temp: 98.2 °F (36.8 °C)   TempSrc: Oral   SpO2: 98%   Weight: 134 lb 6 oz (61 kg)   Height: 5' 3\" (1.6 m)     Physical Examination: General appearance - alert, well appearing, and in no distress, oriented to person, place, and time, normal appearing weight and well hydrated  Mental status - normal mood, behavior, speech, dress, motor activity, and thought processes  Eyes - sclera anicteric  Neck - supple, no significant adenopathy, thyroid exam: thyroid is normal in size without nodules or tenderness  Chest - clear to auscultation, no wheezes, rales or rhonchi, symmetric air entry  Heart - normal rate, regular rhythm, normal S1, S2, no murmurs, rubs, clicks or gallops, normal bilateral carotid upstroke without bruits, no JVD  Abdomen - soft, nontender, nondistended, no masses or organomegaly  bowel sounds normal  Neurological - alert, oriented, normal speech, no focal findings or movement disorder noted  Musculoskeletal - no joint tenderness, deformity or swelling, no muscular tenderness noted. Negative tinel's and phalen's bilaterally. Normal  strength bilaterally6  Extremities - no pedal edema noted, intact peripheral pulses  Skin - normal coloration and turgor, no rashes, no suspicious skin lesions noted    Assessment/ Plan:   Diagnoses and all orders for this visit:    1. Finger joint swelling, right  2. Pain in both wrists  3.  Arthralgia of both hands  Autoimmune dx vs osteoarthritis vs carpal tunnel syndrome vs other  eval for autoimmune disease  Add naproxen for symptom relief  -     SED RATE (ESR)  -     JAZMINE W/REFLEX  -     RHEUMATOID FACTOR, QT  -     naproxen (NAPROSYN) 500 mg tablet; Take 1 Tab by mouth two (2) times daily as needed for Pain. Take with food  -     XR HAND RT MIN 3 V; Future    4. Mild persistent asthma without complication  Symptom pattern consistent with persistent asthma  We discussed lung changes associated with long term uncontrolled inflammation  Add ICS  Albuterol prn  -     albuterol (PROVENTIL HFA, VENTOLIN HFA, PROAIR HFA) 90 mcg/actuation inhaler; Take 2 Puffs by inhalation every four (4) hours as needed for Wheezing for up to 360 days. -     fluticasone propionate (FLOVENT DISKUS) 250 mcg/actuation dsdv; Take 1 Puff by inhalation two (2) times a day. Follow-up and Dispositions    · Return in about 2 months (around 10/2/2019) for f/u asthma. I have discussed the diagnosis with the patient and the intended plan as seen in the above orders. The patient has received an after-visit summary and questions were answered concerning future plans. The patient verbalizes understanding. Medication Side Effects and Warnings were discussed with patient: yes  Patient Labs were reviewed and or requested: yes  Patient Past Records were reviewed and or requested: yes    Patient Instructions          Controlling Your Asthma: Care Instructions  Your Care Instructions    Asthma is a long-term condition that affects your breathing. It causes the airways that lead to the lungs to swell. During an asthma attack, the airways swell and narrow. This makes it hard to breathe. You may wheeze or cough. If you have a bad attack, you may need emergency care. There are two things to do to treat asthma. · Control asthma over the long term. · Treat attacks when they occur. You and your doctor can make an asthma action plan.  It tells you what medicines you need to take every day to control asthma symptoms and what to do if you have an asthma attack. Your asthma action plan can help prevent and treat attacks. When you keep your asthma under control, you can prevent severe attacks and lasting damage to your airways. You need to treat your asthma even when you are not having symptoms. Although asthma is a lifelong disease, treatment can help control it and help you stay healthy. Follow-up care is a key part of your treatment and safety. Be sure to make and go to all appointments, and call your doctor if you are having problems. It's also a good idea to know your test results and keep a list of the medicines you take. How can you care for yourself at home? To control asthma over the long term  Medicines  Controller medicines reduce swelling in your lungs. They also prevent asthma attacks. Take your controller medicine exactly as prescribed. Talk to your doctor if you have any problems with your medicine. · Inhaled corticosteroid is a common and effective controller medicine. Using it the right way can prevent or reduce most side effects. · Take your controller medicine every day, not just when you have symptoms. It helps prevent problems before they occur. · Your doctor may prescribe another medicine that you use along with the corticosteroid. This is often a long-acting bronchodilator. Do not take this medicine by itself. Using a long-acting bronchodilator by itself can increase your risk of a severe or fatal asthma attack. · Do not take inhaled corticosteroids or long-acting bronchodilators to stop an asthma attack that has already started. They don't work fast enough to help. · Talk to your doctor before you use other medicines. Some medicines, such as aspirin, can cause asthma attacks in some people. Education  · Learn what triggers an asthma attack. Avoid these triggers when you can. Common triggers include colds, smoke, air pollution, dust, pollen, mold, pets, cockroaches, stress, and cold air.   · Check yourself for asthma symptoms to know which step to follow in your action plan. Watch for things like being short of breath, having chest tightness, coughing, and wheezing. Also notice if symptoms wake you up at night or if you get tired quickly when you exercise. · If you have a peak flow meter, use it to check how well you are breathing. It can help you know when an asthma attack is going to occur. Then you can take medicine to prevent the asthma attack or make it less severe. · Do not smoke or allow others to smoke around you. Avoid smoky places. Smoking makes asthma worse. If you need help quitting, talk to your doctor about stop-smoking programs and medicines. These can increase your chances of quitting for good. · Avoid colds and the flu. Get a pneumococcal vaccine shot. If you have had one before, ask your doctor whether you need a second dose. Get a flu vaccine every year, as soon as it's available. If you must be around people with colds or the flu, wash your hands often. To treat attacks when they occur  Use your asthma action plan when you have an attack. Your quick-relief medicine will stop an asthma attack. It relaxes the muscles that get tight around the airways. If your doctor prescribed corticosteroid pills to use during an attack, take them as directed. They may take hours to work, but they may shorten the attack and help you breathe better. · Albuterol is an effective quick-relief inhaler. · Take your quick-relief medicine exactly as prescribed. · Always bring your asthma medicine with you when you travel. · You may need to use quick-relief medicine before you exercise. · Call your doctor if you think you are having a problem with your medicine. When should you call for help? Call 911 anytime you think you may need emergency care.  For example, call if:    · You are having severe trouble breathing.    Call your doctor now or seek immediate medical care if:    · Your symptoms do not get better after you have followed your asthma action plan.     · You cough up yellow, dark brown, or bloody mucus (sputum).    Watch closely for changes in your health, and be sure to contact your doctor if:    · Your coughing and wheezing get worse.     · You need to use your quick-relief medicine on more than 2 days a week (unless it is just for exercise).     · You need help figuring out what is triggering your asthma attacks. Where can you learn more? Go to http://louise-amari.info/. Enter H306 in the search box to learn more about \"Controlling Your Asthma: Care Instructions. \"  Current as of: September 5, 2018  Content Version: 12.1  © 4306-2442 [employer]. Care instructions adapted under license by [employer] (which disclaims liability or warranty for this information). If you have questions about a medical condition or this instruction, always ask your healthcare professional. [place] [address] any warranty or liability for your use of this information. Joint Pain: Care Instructions  Your Care Instructions    Many people have small aches and pains from overuse or injury to muscles and joints. Joint injuries often happen during sports or recreation, work tasks, or projects around the home. An overuse injury can happen when you put too much stress on a joint or when you do an activity that stresses the joint over and over, such as using the computer or rowing a boat. You can take action at home to help your muscles and joints get better. You should feel better in 1 to 2 weeks, but it can take 3 months or more to heal completely. Follow-up care is a key part of your treatment and safety. Be sure to make and go to all appointments, and call your doctor if you are having problems. It's also a good idea to know your test results and keep a list of the medicines you take. How can you care for yourself at home?   · Do not put weight on the injured joint for at least a day or two. · For the first day or two after an injury, do not take hot showers or baths, and do not use hot packs. The heat could make swelling worse. · Put ice or a cold pack on the sore joint for 10 to 20 minutes at a time. Try to do this every 1 to 2 hours for the next 3 days (when you are awake) or until the swelling goes down. Put a thin cloth between the ice and your skin. · Wrap the injury in an elastic bandage. Do not wrap it too tightly because this can cause more swelling. · Prop up the sore joint on a pillow when you ice it or anytime you sit or lie down during the next 3 days. Try to keep it above the level of your heart. This will help reduce swelling. · Take an over-the-counter pain medicine, such as acetaminophen (Tylenol), ibuprofen (Advil, Motrin), or naproxen (Aleve). Read and follow all instructions on the label. · After 1 or 2 days of rest, begin moving the joint gently. While the joint is still healing, you can begin to exercise using activities that do not strain or hurt the painful joint. When should you call for help? Call your doctor now or seek immediate medical care if:    · You have signs of infection, such as:  ? Increased pain, swelling, warmth, and redness. ? Red streaks leading from the joint. ? A fever.    Watch closely for changes in your health, and be sure to contact your doctor if:    · Your movement or symptoms are not getting better after 1 to 2 weeks of home treatment. Where can you learn more? Go to http://louise-amari.info/. Enter P205 in the search box to learn more about \"Joint Pain: Care Instructions. \"  Current as of: September 20, 2018  Content Version: 12.1  © 6523-1431 [employer]. Care instructions adapted under license by [employer] (which disclaims liability or warranty for this information).  If you have questions about a medical condition or this instruction, always ask your healthcare professional. [employer] disclaims any warranty or liability for your use of this information.             Natalie ESPINO

## 2019-08-14 LAB
RHEUMATOID FACT SERPL-ACNC: <10 IU/ML (ref 0–13.9)
SPECIMEN STATUS REPORT, ROLRST: NORMAL

## 2019-08-15 ENCOUNTER — TELEPHONE (OUTPATIENT)
Dept: FAMILY MEDICINE CLINIC | Age: 39
End: 2019-08-15

## 2019-08-15 DIAGNOSIS — M25.542 ARTHRALGIA OF BOTH HANDS: ICD-10-CM

## 2019-08-15 DIAGNOSIS — M25.532 PAIN IN BOTH WRISTS: ICD-10-CM

## 2019-08-15 DIAGNOSIS — M25.441 FINGER JOINT SWELLING, RIGHT: Primary | ICD-10-CM

## 2019-08-15 DIAGNOSIS — M25.531 PAIN IN BOTH WRISTS: ICD-10-CM

## 2019-08-15 DIAGNOSIS — M25.541 ARTHRALGIA OF BOTH HANDS: ICD-10-CM

## 2019-08-15 NOTE — TELEPHONE ENCOUNTER
Patient informed of providers lab result and xray note. She would like to be referred to a rheumatologist if that is the next step.

## 2019-08-15 NOTE — TELEPHONE ENCOUNTER
----- Message from Jolynn Lane sent at 8/15/2019  4:24 PM EDT -----  Regarding: FW: Damien Miguel Angel       ----- Message -----  From: Demetra Bennett  Sent: 8/15/2019   2:01 PM EDT  To: [facility] Front Office  Subject: Np. Tassell                                      General Message/Vendor Calls    Caller's first and last name: ((patient))      Reason for call: testing results      Callback required yes/no and why:yes. ..  For resutls      Best contact number(s):[phone]      Details to clarify the request: Pt would like a call back with test results that were taken on Aug 2      Josh Woodall

## 2019-08-15 NOTE — TELEPHONE ENCOUNTER
----- Message from Jolynn Lane sent at 8/15/2019  4:24 PM EDT -----  Regarding: FW: Damien Miguel Angel       ----- Message -----  From: Demetra Bennett  Sent: 8/15/2019   2:01 PM EDT  To: [facility] Front Office  Subject: Np. Tassell                                      General Message/Vendor Calls    Caller's first and last name: ((patient))      Reason for call: testing results      Callback required yes/no and why:yes. ..  For resutls      Best contact number(s):[phone]      Details to clarify the request: Pt would like a call back with test results that were taken on Aug 2      Josh Woodall

## 2019-08-18 PROBLEM — J45.30 MILD PERSISTENT ASTHMA WITHOUT COMPLICATION: Status: ACTIVE | Noted: 2019-08-18

## 2019-10-04 ENCOUNTER — OFFICE VISIT (OUTPATIENT)
Dept: FAMILY MEDICINE CLINIC | Age: 39
End: 2019-10-04

## 2019-10-04 VITALS
OXYGEN SATURATION: 97 % | RESPIRATION RATE: 18 BRPM | TEMPERATURE: 98.3 F | SYSTOLIC BLOOD PRESSURE: 106 MMHG | BODY MASS INDEX: 23.94 KG/M2 | HEIGHT: 63 IN | WEIGHT: 135.1 LBS | DIASTOLIC BLOOD PRESSURE: 73 MMHG | HEART RATE: 76 BPM

## 2019-10-04 DIAGNOSIS — M79.602 PARESTHESIA AND PAIN OF BOTH UPPER EXTREMITIES: ICD-10-CM

## 2019-10-04 DIAGNOSIS — R20.2 PARESTHESIA AND PAIN OF BOTH UPPER EXTREMITIES: ICD-10-CM

## 2019-10-04 DIAGNOSIS — M79.601 PARESTHESIA AND PAIN OF BOTH UPPER EXTREMITIES: ICD-10-CM

## 2019-10-04 DIAGNOSIS — J45.30 MILD PERSISTENT ASTHMA WITHOUT COMPLICATION: Primary | ICD-10-CM

## 2019-10-04 DIAGNOSIS — M25.541 ARTHRALGIA OF BOTH HANDS: ICD-10-CM

## 2019-10-04 DIAGNOSIS — M25.542 ARTHRALGIA OF BOTH HANDS: ICD-10-CM

## 2019-10-04 DIAGNOSIS — Z23 ENCOUNTER FOR IMMUNIZATION: ICD-10-CM

## 2019-10-04 RX ORDER — ACETAMINOPHEN 325 MG/1
TABLET ORAL
COMMUNITY

## 2019-10-04 NOTE — PATIENT INSTRUCTIONS
Vaccine Information Statement    Influenza (Flu) Vaccine (Inactivated or Recombinant): What You Need to Know    Many Vaccine Information Statements are available in Faroese and other languages. See www.immunize.org/vis  Hojas de información sobre vacunas están disponibles en español y en muchos otros idiomas. Visite www.immunize.org/vis    1. Why get vaccinated? Influenza vaccine can prevent influenza (flu). Flu is a contagious disease that spreads around the [facility] every year, usually between October and May. Anyone can get the flu, but it is more dangerous for some people. Infants and young children, people 72years of age and older, pregnant women, and people with certain health conditions or a weakened immune system are at greatest risk of flu complications. Pneumonia, bronchitis, sinus infections and ear infections are examples of flu-related complications. If you have a medical condition, such as heart disease, cancer or diabetes, flu can make it worse. Flu can cause fever and chills, sore throat, muscle aches, fatigue, cough, headache, and runny or stuffy nose. Some people may have vomiting and diarrhea, though this is more common in children than adults. Each year thousands of people in the [facility] die from flu, and many more are hospitalized. Flu vaccine prevents millions of illnesses and flu-related visits to the doctor each year. 2. Influenza vaccines     CDC recommends everyone 10months of age and older get vaccinated every flu season. Children 6 months through 6years of age may need 2 doses during a single flu season. Everyone else needs only 1 dose each flu season. It takes about 2 weeks for protection to develop after vaccination. There are many flu viruses, and they are always changing. Each year a new flu vaccine is made to protect against three or four viruses that are likely to cause disease in the upcoming flu season.  Even when the vaccine doesnt exactly match these viruses, it may still provide some protection. Influenza vaccine does not cause flu. Influenza vaccine may be given at the same time as other vaccines. 3. Talk with your health care provider    Tell your vaccine provider if the person getting the vaccine:   Has had an allergic reaction after a previous dose of influenza vaccine, or has any severe, life-threatening allergies.  Has ever had Guillain-Barré Syndrome (also called GBS). In some cases, your health care provider may decide to postpone influenza vaccination to a future visit. People with minor illnesses, such as a cold, may be vaccinated. People who are moderately or severely ill should usually wait until they recover before getting influenza vaccine. Your health care provider can give you more information. 4. Risks of a reaction     Soreness, redness, and swelling where shot is given, fever, muscle aches, and headache can happen after influenza vaccine.  There may be a very small increased risk of Guillain-Barré Syndrome (GBS) after inactivated influenza vaccine (the flu shot). Damian Torres children who get the flu shot along with pneumococcal vaccine (PCV13), and/or DTaP vaccine at the same time might be slightly more likely to have a seizure caused by fever. Tell your health care provider if a child who is getting flu vaccine has ever had a seizure. People sometimes faint after medical procedures, including vaccination. Tell your provider if you feel dizzy or have vision changes or ringing in the ears. As with any medicine, there is a very remote chance of a vaccine causing a severe allergic reaction, other serious injury, or death. 5. What if there is a serious problem? An allergic reaction could occur after the vaccinated person leaves the clinic.  If you see signs of a severe allergic reaction (hives, swelling of the face and throat, difficulty breathing, a fast heartbeat, dizziness, or weakness), call 9-1-1 and get the person to the nearest hospital.    For other signs that concern you, call your health care provider. Adverse reactions should be reported to the Vaccine Adverse Event Reporting System (VAERS). Your health care provider will usually file this report, or you can do it yourself. Visit the VAERS website at www.vaers. [facility].gov or call 7-469.398.9092. VAERS is only for reporting reactions, and VAERS staff do not give medical advice. 6. The National Vaccine Injury Compensation Program    The [facility] Vaccine Injury Compensation Program (VICP) is a federal program that was created to compensate people who may have been injured by certain vaccines. Visit the VICP website at www.[facility].gov/vaccinecompensation or call 2-386.354.7648 to learn about the program and about filing a claim. There is a time limit to file a claim for compensation. 7. How can I learn more?  Ask your health care provider.  Call your local or state health department.  Contact the Centers for Disease Control and Prevention (CDC):  - Call 6-610.851.3627 (7-489-DFP-INFO) or  - Visit CDCs influenza website at www.cdc.gov/flu    Vaccine Information Statement (Interim)  Inactivated Influenza Vaccine   8/15/2019  44 US[address] Issac [id number]   Department of Health and Human Services  Centers for Disease Control and Prevention    Office Use Only      Vaccine Information Statement    Pneumococcal Polysaccharide Vaccine: What You Need to Know    Many Vaccine Information Statements are available in Iraqi and other languages. See www.immunize.org/vis. Hojas de información Sobre Vacunas están disponibles en español y en muchos otros idiomas. Visite Biju.si. 1. Why get vaccinated? Vaccination can protect older adults (and some children and younger adults) from pneumococcal disease. Pneumococcal disease is caused by bacteria that can spread from person to person through close contact.   It can cause ear infections, and it can also lead to more serious infections of the:   Lungs (pneumonia),   Blood (bacteremia), and   Covering of the brain and spinal cord (meningitis). Meningitis can cause deafness and brain damage, and it can be fatal.      Anyone can get pneumococcal disease, but children under 3years of age, people with certain medical conditions, adults over 72years of age, and cigarette smokers are at the highest risk. About 18,000 older adults die each year from pneumococcal disease in the [place]. Treatment of pneumococcal infections with penicillin and other drugs used to be more effective. But some strains of the disease have become resistant to these drugs. This makes prevention of the disease, through vaccination, even more important. 2. Pneumococcal polysaccharide vaccine (PPSV23)    Pneumococcal polysaccharide vaccine (PPSV23) protects against 23 types of pneumococcal bacteria. It will not prevent all pneumococcal disease. PPSV23 is recommended for:   All adults 72years of age and older,   Anyone 2 through 59years of age with certain long-term health problems,   Anyone 2 through 59years of age with a weakened immune system,   Adults 23 through 59years of age who smoke cigarettes or have asthma. Most people need only one dose of PPSV. A second dose is recommended for certain high-risk groups. People 72 and older should get a dose even if they have gotten one or more doses of the vaccine before they turned 65. Your healthcare provider can give you more information about these recommendations. Most healthy adults develop protection within 2 to 3 weeks of getting the shot. 3. Some people should not get this vaccine     Anyone who has had a life-threatening allergic reaction to PPSV should not get another dose.  Anyone who has a severe allergy to any component of PPSV should not receive it. Tell your provider if you have any severe allergies.      Anyone who is moderately or severely ill when the shot is scheduled may be asked to wait until they recover before getting the vaccine. Someone with a mild illness can usually be vaccinated.  Children less than 3years of age should not receive this vaccine.  There is no evidence that PPSV is harmful to either a pregnant woman or to her fetus. However, as a precaution, women who need the vaccine should be vaccinated before becoming pregnant, if possible. 4. Risks of a vaccine reaction    With any medicine, including vaccines, there is a chance of side effects. These are usually mild and go away on their own, but serious reactions are also possible. About half of people who get PPSV have mild side effects, such as redness or pain where the shot is given, which go away within about two days. Less than 1 out of 100 people develop a fever, muscle aches, or more severe local reactions. Problems that could happen after any vaccine:     People sometimes faint after a medical procedure, including vaccination. Sitting or lying down for about 15 minutes can help prevent fainting, and injuries caused by a fall. Tell your doctor if you feel dizzy, or have vision changes or ringing in the ears.  Some people get severe pain in the shoulder and have difficulty moving the arm where a shot was given. This happens very rarely.  Any medication can cause a severe allergic reaction. Such reactions from a vaccine are very rare, estimated at about 1 in a million doses, and would happen within a few minutes to a few hours after the vaccination. As with any medicine, there is a very remote chance of a vaccine causing a serious injury or death. The safety of vaccines is always being monitored. For more information, visit: www.cdc.gov/vaccinesafety/     5. What if there is a serious reaction? What should I look for?     Look for anything that concerns you, such as signs of a severe allergic reaction, very high fever, or unusual behavior. Signs of a severe allergic reaction can include hives, swelling of the face and throat, difficulty breathing, a fast heartbeat, dizziness, and weakness. These would usually start a few minutes to a few hours after the vaccination. What should I do? If you think it is a severe allergic reaction or other emergency that cant wait, call 9-1-1 or get to the nearest hospital. Otherwise, call your doctor. Afterward, the reaction should be reported to the Vaccine Adverse Event Reporting System (VAERS). Your doctor might file this report, or you can do it yourself through the VAERS web site at www.[employer]. [facility].gov, or by calling 3-892.442.5699. [employer] does not give medical advice. 6. How can I learn more?  Ask your doctor. He or she can give you the vaccine package insert or suggest other sources of information.  Call your local or state health department.  Contact the Centers for Disease Control and Prevention (CDC):  - Call 2-403.488.2622 (1-800-CDC-INFO) or  - Visit CDCs website at www.cdc.gov/vaccines    Vaccine Information Statement   PPSV   04/24/2015    [facility] and [facility] for Disease Control and Prevention    Office Use Only       Controlling Your Asthma: Care Instructions  Your Care Instructions    Asthma is a long-term condition that affects your breathing. It causes the airways that lead to the lungs to swell. During an asthma attack, the airways swell and narrow. This makes it hard to breathe. You may wheeze or cough. If you have a bad attack, you may need emergency care. There are two things to do to treat asthma. · Control asthma over the long term. · Treat attacks when they occur. You and your doctor can make an asthma action plan. It tells you what medicines you need to take every day to control asthma symptoms and what to do if you have an asthma attack. Your asthma action plan can help prevent and treat attacks.   When you keep your asthma under control, you can prevent severe attacks and lasting damage to your airways. You need to treat your asthma even when you are not having symptoms. Although asthma is a lifelong disease, treatment can help control it and help you stay healthy. Follow-up care is a key part of your treatment and safety. Be sure to make and go to all appointments, and call your doctor if you are having problems. It's also a good idea to know your test results and keep a list of the medicines you take. How can you care for yourself at home? To control asthma over the long term  Medicines  Controller medicines reduce swelling in your lungs. They also prevent asthma attacks. Take your controller medicine exactly as prescribed. Talk to your doctor if you have any problems with your medicine. · Inhaled corticosteroid is a common and effective controller medicine. Using it the right way can prevent or reduce most side effects. · Take your controller medicine every day, not just when you have symptoms. It helps prevent problems before they occur. · Your doctor may prescribe another medicine that you use along with the corticosteroid. This is often a long-acting bronchodilator. Do not take this medicine by itself. Using a long-acting bronchodilator by itself can increase your risk of a severe or fatal asthma attack. · Do not take inhaled corticosteroids or long-acting bronchodilators to stop an asthma attack that has already started. They don't work fast enough to help. · Talk to your doctor before you use other medicines. Some medicines, such as aspirin, can cause asthma attacks in some people. Education  · Learn what triggers an asthma attack. Avoid these triggers when you can. Common triggers include colds, smoke, air pollution, dust, pollen, mold, pets, cockroaches, stress, and cold air. · Check yourself for asthma symptoms to know which step to follow in your action plan.  Watch for things like being short of breath, having chest tightness, coughing, and wheezing. Also notice if symptoms wake you up at night or if you get tired quickly when you exercise. · If you have a peak flow meter, use it to check how well you are breathing. It can help you know when an asthma attack is going to occur. Then you can take medicine to prevent the asthma attack or make it less severe. · Do not smoke or allow others to smoke around you. Avoid smoky places. Smoking makes asthma worse. If you need help quitting, talk to your doctor about stop-smoking programs and medicines. These can increase your chances of quitting for good. · Avoid colds and the flu. Get a pneumococcal vaccine shot. If you have had one before, ask your doctor whether you need a second dose. Get a flu vaccine every year, as soon as it's available. If you must be around people with colds or the flu, wash your hands often. To treat attacks when they occur  Use your asthma action plan when you have an attack. Your quick-relief medicine will stop an asthma attack. It relaxes the muscles that get tight around the airways. If your doctor prescribed corticosteroid pills to use during an attack, take them as directed. They may take hours to work, but they may shorten the attack and help you breathe better. · Albuterol is an effective quick-relief inhaler. · Take your quick-relief medicine exactly as prescribed. · Always bring your asthma medicine with you when you travel. · You may need to use quick-relief medicine before you exercise. · Call your doctor if you think you are having a problem with your medicine. When should you call for help? Call 911 anytime you think you may need emergency care.  For example, call if:    · You are having severe trouble breathing.    Call your doctor now or seek immediate medical care if:    · Your symptoms do not get better after you have followed your asthma action plan.     · You cough up yellow, dark brown, or bloody mucus (sputum).    Watch closely for changes in your health, and be sure to contact your doctor if:    · Your coughing and wheezing get worse.     · You need to use your quick-relief medicine on more than 2 days a week (unless it is just for exercise).     · You need help figuring out what is triggering your asthma attacks. Where can you learn more? Go to http://louise-amari.info/. Enter M627 in the search box to learn more about \"Controlling Your Asthma: Care Instructions. \"  Current as of: June 9, 2019  Content Version: 12.2  © 2459-9066 [employer], [employer]. Care instructions adapted under license by [employer] (which disclaims liability or warranty for this information). If you have questions about a medical condition or this instruction, always ask your healthcare professional. [address] any warranty or liability for your use of this information.

## 2019-10-04 NOTE — PROGRESS NOTES
Elisabeth Ulloa is a 42 y.o. female , id x 2(name and ). Reviewed record, history, and  medications. Chief Complaint   Patient presents with    Asthma     states that she sees a minimal improvement on the Flovent    Labs     follow up on labs from last visit    Tingling     intermittently in b/l forearms. no known exacerbation. Vitals:    10/04/19 1405   Weight: 135 lb 1.6 oz (61.3 kg)   Height: 5' 3\" (1.6 m)   PainSc:   0 - No pain   LMP: 09/15/2019       Coordination of Care Questionnaire:   1) Have you been to an emergency room, urgent care, or hospitalized since your last visit?   no       2. Have seen or consulted any other health care provider since your last visit?  NO    3 most recent PHQ Screens 2019   Little interest or pleasure in doing things Not at all   Feeling down, depressed, irritable, or hopeless Not at all   Total Score PHQ 2 0

## 2019-10-07 NOTE — PROGRESS NOTES
Subjective:      Mary Herrera is an 42 y.o. female who for an asthma follow-up visit, not currently in exacerbation. Since starting flovent 2 months ago, reports rare use of rescue, no night time symptoms. Spring, early summer, and fall tend to be her peak seasons for exacerbations. Observed precipitants include pollens and none specifically noted. Current limitations in activity from asthma: none. Number of days of school or work missed in the last month: 0. Frequency of use of quick-relief meds: rare. Regimen compliance: The patient reports adherence to this regimen. Patient does not smoke cigarettes. Continues to c/o myalgias, arthrlagias, and now paresthesias in upper extremities. ESR, JAZMINE, and rheumatoid factor were all normal in August. She requested referral to rheumatology but has not yet made an appt. Review of Systems   A comprehensive review of systems was negative except for that written in the HPI. Objective:     Visit Vitals  /73   Pulse 76   Temp 98.3 °F (36.8 °C)   Resp 18   Ht 5' 3\" (1.6 m)   Wt 135 lb 1.6 oz (61.3 kg)   LMP 09/15/2019 (Approximate)   SpO2 97%   BMI 23.93 kg/m²        General:  alert, cooperative, no distress   HEENT:  ENT exam normal, no neck nodes or sinus tenderness   Lungs: clear to auscultation bilaterally   Heart:  regular rate and rhythm, S1, S2 normal, no murmur, click, rub or gallop   Abdomen: soft, non-tender. Bowel sounds normal. No masses,  no organomegaly   Extremities: extremities normal, atraumatic, no cyanosis or edema   Neuro: No movement disorder or tremor. No gross focal deficits     Assessment/Plan:     doing well on current treatment. Reviewed treatment goals of prevention of symptoms, minimizing limitation in activity, prevention of exacerbations and use of ER/inpatient care, maintenance of optimal pulmonary function, minimization of adverse effects of treatment.   Discussed distinction between quick-relief and controlled medications. Discussed medication dosage, use, side effects, and goals of treatment in detail. Discussed avoidance of precipitants. Discussed technique for using MDIs and/or nebulizer. Discussed monitoring symptoms and use of quick-relief medications and contacting us early in the course of exacerbations. Discussed medication dosage, use, side effects, and goals of treatment in detail. .    Diagnoses and all orders for this visit:    1. Mild persistent asthma without complication  Improved  Continue flovent through the fall; may consider discontinuing once winter arrives and monitoring symptoms, with plan to resume with onset of spring  Albuterol prn    2. Arthralgia of both hands  3. Paresthesia and pain of both upper extremities  Fibromyalgia vs other  Refer to rheumatology    4. Encounter for immunization  -     ID IMMUNIZ ADMIN,1 SINGLE/COMB VAC/TOXOID  -     INFLUENZA VIRUS VAC QUAD,SPLIT,PRESV FREE SYRINGE IM  -     PNEUMOCOCCAL POLYSACCHARIDE VACCINE, 23-VALENT, ADULT OR IMMUNOSUPPRESSED PT DOSE,      Follow-up and Dispositions    · Return in about 6 months (around 4/4/2020), or if symptoms worsen or fail to improve, for f/u asthma. I have discussed the diagnosis with the patient and the intended plan as seen in the above orders. The patient has received an after-visit summary and questions were answered concerning future plans. Patient conveyed understanding of the plan at the time of the visit.     Kevin Greenberg NP  10/06/19

## 2020-01-15 NOTE — PROGRESS NOTES
Vincenzo Morton is a ,  42 y.o. female [facility] whose LMP was 2019 \"around arpit\" stated patient. She presents for her annual checkup. She is having no significant problems. Menstrual status:    Her periods are minimal in flow. She is using one to two pads or tampons per day, usually regular and last 26-30 days. Patient has Mirena IUD. She denies dysmenorrhea. She reports no premenstrual symptoms. Contraception:    The current method of family planning is IUD and She declines contraception and counseling. Sexual history:    She  reports being sexually active and has had partner(s) who are Male. She reports using the following method of birth control/protection: Condom/IUD    Medical conditions:    Since her last annual GYN exam about 2018 ago, she has not the following changes in her health history: diagnosed with RA     Pap and Mammogram History:    Her most recent Pap smear was unable to be read/-HPV obtained 2018 year(s) ago. The patient has never had a mammogram.    The patient does not have a family history of breast cancer. Past Medical History:   Diagnosis Date    Asthma     proair used as needed    Gestational diabetes     Gestational diabetes     History of ectopic pregnancy      Past Surgical History:   Procedure Laterality Date    HX RIGHT SALPINGECTOMY         Current Outpatient Medications   Medication Sig Dispense Refill    acetaminophen (TYLENOL) 325 mg tablet Take  by mouth every four (4) hours as needed for Pain.  glucosamine HCl/chondroitin blunt (GLUCOSAMINE-CHONDROITIN PO) Take  by mouth.  multivitamin (ONE A DAY) tablet Take 1 Tab by mouth daily.  naproxen (NAPROSYN) 500 mg tablet Take 1 Tab by mouth two (2) times daily as needed for Pain.  Take with food 45 Tab 1    albuterol (PROVENTIL HFA, VENTOLIN HFA, PROAIR HFA) 90 mcg/actuation inhaler Take 2 Puffs by inhalation every four (4) hours as needed for Wheezing for up to 360 days. 1 Inhaler 5    fluticasone propionate (FLOVENT DISKUS) 250 mcg/actuation dsdv Take 1 Puff by inhalation two (2) times a day. 1 Inhaler 2     Allergies: Iodine and Shellfish derived   Social History     Socioeconomic History    Marital status:      Spouse name: Not on file    Number of children: Not on file    Years of education: Not on file    Highest education level: Not on file   Occupational History    Not on file   Social Needs    Financial resource strain: Not on file    Food insecurity:     Worry: Not on file     Inability: Not on file    Transportation needs:     Medical: Not on file     Non-medical: Not on file   Tobacco Use    Smoking status: Former Smoker     Packs/day: 0.50     Types: Cigarettes    Smokeless tobacco: Never Used   Substance and Sexual Activity    Alcohol use: Yes     Comment: rarely    Drug use: No    Sexual activity: Yes     Partners: Male     Birth control/protection: Condom   Lifestyle    Physical activity:     Days per week: Not on file     Minutes per session: Not on file    Stress: Not on file   Relationships    Social connections:     Talks on phone: Not on file     Gets together: Not on file     Attends [employer] service: Not on file     Active member of club or organization: Not on file     Attends meetings of clubs or organizations: Not on file     Relationship status: Not on file    Intimate partner violence:     Fear of current or ex partner: Not on file     Emotionally abused: Not on file     Physically abused: Not on file     Forced sexual activity: Not on file   Other Topics Concern    Not on file   Social History Narrative    Not on file     Tobacco History:  reports that she has quit smoking. Her smoking use included cigarettes. She smoked 0.50 packs per day. She has never used smokeless tobacco.  Alcohol Abuse:  reports current alcohol use. Drug Abuse:  reports no history of drug use.     Patient Active Problem List   Diagnosis Code    Mild persistent asthma without complication F08.38       Review of Systems - History obtained from the patient  Constitutional: negative for weight loss, fever, night sweats  HEENT: negative for hearing loss, earache, congestion, snoring, sorethroat  CV: negative for chest pain, palpitations, edema  Resp: negative for cough, shortness of breath, wheezing  GI: negative for change in bowel habits, abdominal pain, black or bloody stools  : negative for frequency, dysuria, hematuria, vaginal discharge  MSK: negative for back pain, joint pain, muscle pain  Breast: negative for breast lumps, nipple discharge, galactorrhea  Skin :negative for itching, rash, hives  Neuro: negative for dizziness, headache, confusion, weakness  Psych: negative for anxiety, depression, change in mood  Heme/lymph: negative for bleeding, bruising, pallor    Physical Exam    There were no vitals taken for this visit.     Constitutional  · Appearance: well-nourished, well developed, alert, in no acute distress    HENT  · Head and Face: appears normal    Neck  · Inspection/Palpation: normal appearance, no masses or tenderness  · Lymph Nodes: no lymphadenopathy present    Chest  · Respiratory Effort: breathing normal    Breasts  · Inspection of Breasts: breasts symmetrical, no skin changes, no discharge present, nipple appearance normal, no skin retraction present  · Palpation of Breasts and Axillae: no masses present on palpation, no breast tenderness  · Axillary Lymph Nodes: no lymphadenopathy present    Gastrointestinal  · Abdominal Examination: abdomen non-tender to palpation, normal bowel sounds, no masses present  · Liver and spleen: no hepatomegaly present, spleen not palpable  · Hernias: no hernias identified    Genitourinary  · External Genitalia: normal appearance for age, no discharge present, no tenderness present, no inflammatory lesions present, no masses present, no atrophy present  · Vagina: normal vaginal vault without central or paravaginal defects, no discharge present, no inflammatory lesions present, no masses present  · Bladder: non-tender to palpation  · Urethra: appears normal  · Cervix: normal, IUD string seen   · Uterus: normal size, shape and consistency  · Adnexa: no adnexal tenderness present, no adnexal masses present  · Perineum: perineum within normal limits, no evidence of trauma, no rashes or skin lesions present  · Anus: anus within normal limits, no hemorrhoids present  · Inguinal Lymph Nodes: no lymphadenopathy present    Skin  · General Inspection: no rash, no lesions identified    Neurologic/Psychiatric  · Mental Status:  · Orientation: grossly oriented to person, place and time  · Mood and Affect: mood normal, affect appropriate    . Assessment:  Routine gynecologic examination  Her current medical status is satisfactory with no evidence of significant gynecologic issues.     Plan:  Counseled re: diet, exercise, healthy lifestyle  Return for yearly wellness visits

## 2020-01-17 ENCOUNTER — OFFICE VISIT (OUTPATIENT)
Dept: OBGYN CLINIC | Age: 40
End: 2020-01-17

## 2020-01-17 VITALS
BODY MASS INDEX: 23.21 KG/M2 | DIASTOLIC BLOOD PRESSURE: 69 MMHG | HEART RATE: 77 BPM | HEIGHT: 63 IN | WEIGHT: 131 LBS | SYSTOLIC BLOOD PRESSURE: 108 MMHG

## 2020-01-17 DIAGNOSIS — Z01.419 WELL WOMAN EXAM WITH ROUTINE GYNECOLOGICAL EXAM: ICD-10-CM

## 2020-01-17 DIAGNOSIS — Z11.51 SPECIAL SCREENING EXAMINATION FOR HUMAN PAPILLOMAVIRUS (HPV): ICD-10-CM

## 2020-01-17 DIAGNOSIS — Z01.419 ROUTINE GYNECOLOGICAL EXAMINATION: Primary | ICD-10-CM

## 2020-01-17 RX ORDER — METHOTREXATE 2.5 MG/1
TABLET ORAL
COMMUNITY
Start: 2020-01-02

## 2020-01-17 RX ORDER — PREDNISONE 5 MG/1
TABLET ORAL
COMMUNITY
Start: 2020-01-02

## 2020-01-17 RX ORDER — FOLIC ACID 1 MG/1
TABLET ORAL
COMMUNITY
Start: 2020-01-02

## 2020-01-17 RX ORDER — MELATONIN
DAILY
COMMUNITY

## 2020-01-17 NOTE — PATIENT INSTRUCTIONS
Well Visit, Ages 25 to 48: Care Instructions  Your Care Instructions    Physical exams can help you stay healthy. Your doctor has checked your overall health and may have suggested ways to take good care of yourself. He or she also may have recommended tests. At home, you can help prevent illness with healthy eating, regular exercise, and other steps. Follow-up care is a key part of your treatment and safety. Be sure to make and go to all appointments, and call your doctor if you are having problems. It's also a good idea to know your test results and keep a list of the medicines you take. How can you care for yourself at home? · Reach and stay at a healthy weight. This will lower your risk for many problems, such as obesity, diabetes, heart disease, and high blood pressure. · Get at least 30 minutes of physical activity on most days of the week. Walking is a good choice. You also may want to do other activities, such as running, swimming, cycling, or playing tennis or team sports. Discuss any changes in your exercise program with your doctor. · Do not smoke or allow others to smoke around you. If you need help quitting, talk to your doctor about stop-smoking programs and medicines. These can increase your chances of quitting for good. · Talk to your doctor about whether you have any risk factors for sexually transmitted infections (STIs). Having one sex partner (who does not have STIs and does not have sex with anyone else) is a good way to avoid these infections. · Use birth control if you do not want to have children at this time. Talk with your doctor about the choices available and what might be best for you. · Protect your skin from too much sun. When you're outdoors from 10 a.m. to 4 p.m., stay in the shade or cover up with clothing and a hat with a wide brim. Wear sunglasses that block UV rays. Even when it's cloudy, put broad-spectrum sunscreen (SPF 30 or higher) on any exposed skin.   · See a dentist one or two times a year for checkups and to have your teeth cleaned. · Wear a seat belt in the car. Follow your doctor's advice about when to have certain tests. These tests can spot problems early. For everyone  · Cholesterol. Have the fat (cholesterol) in your blood tested after age 21. Your doctor will tell you how often to have this done based on your age, family history, or other things that can increase your risk for heart disease. · Blood pressure. Have your blood pressure checked during a routine doctor visit. Your doctor will tell you how often to check your blood pressure based on your age, your blood pressure results, and other factors. · Vision. Talk with your doctor about how often to have a glaucoma test.  · Diabetes. Ask your doctor whether you should have tests for diabetes. · Colon cancer. Your risk for colorectal cancer gets higher as you get older. Some experts say that adults should start regular screening at age 48 and stop at age 76. Others say to start before age 48 or continue after age 76. Talk with your doctor about your risk and when to start and stop screening. For women  · Breast exam and mammogram. Talk to your doctor about when you should have a clinical breast exam and a mammogram. Medical experts differ on whether and how often women under 50 should have these tests. Your doctor can help you decide what is right for you. · Cervical cancer screening test and pelvic exam. Begin with a Pap test at age 24. The test often is part of a pelvic exam. Starting at age 27, you may choose to have a Pap test, an HPV test, or both tests at the same time (called co-testing). Talk with your doctor about how often to have testing. · Tests for sexually transmitted infections (STIs). Ask whether you should have tests for STIs. You may be at risk if you have sex with more than one person, especially if your partners do not wear condoms.   For men  · Tests for sexually transmitted infections (STIs). Ask whether you should have tests for STIs. You may be at risk if you have sex with more than one person, especially if you do not wear a condom. · Testicular cancer exam. Ask your doctor whether you should check your testicles regularly. · Prostate exam. Talk to your doctor about whether you should have a blood test (called a PSA test) for prostate cancer. Experts differ on whether and when men should have this test. Some experts suggest it if you are older than 39 and are -American or have a father or brother who got prostate cancer when he was younger than 72. When should you call for help? Watch closely for changes in your health, and be sure to contact your doctor if you have any problems or symptoms that concern you. Where can you learn more? Go to http://louise-amari.info/. Enter P072 in the search box to learn more about \"Well Visit, Ages 25 to 48: Care Instructions. \"  Current as of: December 13, 2018  Content Version: 12.2  © 4090-1113 [employer]. Care instructions adapted under license by [employer] (which disclaims liability or warranty for this information). If you have questions about a medical condition or this instruction, always ask your healthcare professional. [place] [address] any warranty or liability for your use of this information.

## 2020-01-25 LAB
CYTOLOGIST CVX/VAG CYTO: ABNORMAL
CYTOLOGY CVX/VAG DOC CYTO: ABNORMAL
CYTOLOGY CVX/VAG DOC THIN PREP: ABNORMAL
CYTOLOGY HISTORY:: ABNORMAL
DX ICD CODE: ABNORMAL
HPV I/H RISK 1 DNA CVX QL PROBE+SIG AMP: POSITIVE
HPV16 DNA CVX QL PROBE+SIG AMP: NEGATIVE
HPV18 DNA CVX QL PROBE+SIG AMP: NEGATIVE
Lab: ABNORMAL
Lab: ABNORMAL
OTHER STN SPEC: ABNORMAL
STAT OF ADQ CVX/VAG CYTO-IMP: ABNORMAL

## 2020-01-27 NOTE — PROGRESS NOTES
Patient advised of MD reviewed lab results and recommendations. Patient advised if smoking to stop and she can take a prenatal vitamin with folic acid. Patient advised she can go to our web site under patient information to find out more about HPV but that MD following recommended guidelines      Patient verbalized understanding.

## 2020-04-13 ENCOUNTER — TELEPHONE (OUTPATIENT)
Dept: FAMILY MEDICINE CLINIC | Age: 40
End: 2020-04-13

## 2020-04-13 NOTE — TELEPHONE ENCOUNTER
Attempted to reach pt to inform message from provider. No answer, left detailed voicemail to return call to schedule virtual appointment.

## 2020-04-13 NOTE — TELEPHONE ENCOUNTER
rec'd request from [employer] for completion of [facility] Report in relation to her application for life insurance. Has not been seen since Oct 2019, form is requesting specific information on symptoms in the last 2 years up to present. Will need Virtual visit for asthma follow up before I can complete the form.

## 2020-04-16 ENCOUNTER — TELEPHONE (OUTPATIENT)
Dept: FAMILY MEDICINE CLINIC | Age: 40
End: 2020-04-16

## 2020-04-16 NOTE — TELEPHONE ENCOUNTER
Call from Call Center:      Suj[facility] Can[facility] [employer] [facility]   Phone Number: [phone]             Caller's first and last name: Vicki Corey "[employer] processing   Reason for call: Status of medical Questionnaire   Callback required yes/no and why: Yes   Best contact number(s): [phone]   Details to clarify the request: n/a          Thank you

## 2020-04-17 ENCOUNTER — VIRTUAL VISIT (OUTPATIENT)
Dept: FAMILY MEDICINE CLINIC | Age: 40
End: 2020-04-17

## 2020-04-17 DIAGNOSIS — J45.30 MILD PERSISTENT ASTHMA WITHOUT COMPLICATION: Primary | ICD-10-CM

## 2020-04-17 DIAGNOSIS — J30.1 SEASONAL ALLERGIC RHINITIS DUE TO POLLEN: ICD-10-CM

## 2020-04-17 PROBLEM — M06.4 INFLAMMATORY POLYARTHRITIS (HCC): Status: ACTIVE | Noted: 2020-04-17

## 2020-04-17 RX ORDER — FLUTICASONE PROPIONATE 250 UG/1
1 POWDER, METERED RESPIRATORY (INHALATION) 2 TIMES DAILY
Qty: 1 INHALER | Refills: 5 | Status: SHIPPED | OUTPATIENT
Start: 2020-04-17

## 2020-04-17 RX ORDER — ALBUTEROL SULFATE 90 UG/1
2 AEROSOL, METERED RESPIRATORY (INHALATION)
Qty: 1 INHALER | Refills: 5 | Status: SHIPPED | OUTPATIENT
Start: 2020-04-17 | End: 2021-04-12

## 2020-04-17 NOTE — PATIENT INSTRUCTIONS
Controlling Your Asthma: Care Instructions Your Care Instructions Asthma is a long-term condition that affects your breathing. It causes the airways that lead to the lungs to swell. During an asthma attack, the airways swell and narrow. This makes it hard to breathe. You may wheeze or cough. If you have a bad attack, you may need emergency care. There are two things to do to treat asthma. · Control asthma over the long term. · Treat attacks when they occur. You and your doctor can make an asthma action plan. It tells you what medicines you need to take every day to control asthma symptoms and what to do if you have an asthma attack. Your asthma action plan can help prevent and treat attacks. When you keep your asthma under control, you can prevent severe attacks and lasting damage to your airways. You need to treat your asthma even when you are not having symptoms. Although asthma is a lifelong disease, treatment can help control it and help you stay healthy. Follow-up care is a key part of your treatment and safety. Be sure to make and go to all appointments, and call your doctor if you are having problems. It's also a good idea to know your test results and keep a list of the medicines you take. How can you care for yourself at home? To control asthma over the long term Medicines Controller medicines reduce swelling in your lungs. They also prevent asthma attacks. Take your controller medicine exactly as prescribed. Talk to your doctor if you have any problems with your medicine. · Inhaled corticosteroid is a common and effective controller medicine. Using it the right way can prevent or reduce most side effects. · Take your controller medicine every day, not just when you have symptoms. It helps prevent problems before they occur. · Your doctor may prescribe another medicine that you use along with the corticosteroid. This is often a long-acting bronchodilator.  Do not take this medicine by itself. Using a long-acting bronchodilator by itself can increase your risk of a severe or fatal asthma attack. · Do not take inhaled corticosteroids or long-acting bronchodilators to stop an asthma attack that has already started. They don't work fast enough to help. · Talk to your doctor before you use other medicines. Some medicines, such as aspirin, can cause asthma attacks in some people. Education · Learn what triggers an asthma attack. Avoid these triggers when you can. Common triggers include colds, smoke, air pollution, dust, pollen, mold, pets, cockroaches, stress, and cold air. · Check yourself for asthma symptoms to know which step to follow in your action plan. Watch for things like being short of breath, having chest tightness, coughing, and wheezing. Also notice if symptoms wake you up at night or if you get tired quickly when you exercise. · If you have a peak flow meter, use it to check how well you are breathing. It can help you know when an asthma attack is going to occur. Then you can take medicine to prevent the asthma attack or make it less severe. · Do not smoke or allow others to smoke around you. Avoid smoky places. Smoking makes asthma worse. If you need help quitting, talk to your doctor about stop-smoking programs and medicines. These can increase your chances of quitting for good. · Avoid colds and the flu. Get a pneumococcal vaccine shot. If you have had one before, ask your doctor whether you need a second dose. Get a flu vaccine every year, as soon as it's available. If you must be around people with colds or the flu, wash your hands often. To treat attacks when they occur Use your asthma action plan when you have an attack. Your quick-relief medicine will stop an asthma attack. It relaxes the muscles that get tight around the airways.  
If your doctor prescribed corticosteroid pills to use during an attack, take them as directed. They may take hours to work, but they may shorten the attack and help you breathe better. · Albuterol is an effective quick-relief inhaler. · Take your quick-relief medicine exactly as prescribed. · Always bring your asthma medicine with you when you travel. · You may need to use quick-relief medicine before you exercise. · Call your doctor if you think you are having a problem with your medicine. When should you call for help? Call 911 anytime you think you may need emergency care. For example, call if: 
  · You are having severe trouble breathing.  
 Call your doctor now or seek immediate medical care if: 
  · Your symptoms do not get better after you have followed your asthma action plan.  
  · You cough up yellow, dark brown, or bloody mucus (sputum).  
 Watch closely for changes in your health, and be sure to contact your doctor if: 
  · Your coughing and wheezing get worse.  
  · You need to use your quick-relief medicine on more than 2 days a week (unless it is just for exercise).  
  · You need help figuring out what is triggering your asthma attacks. Where can you learn more? Go to http://louise-amari.info/ Enter J493 in the search box to learn more about \"Controlling Your Asthma: Care Instructions. \" Current as of: June 9, 2019Content Version: 12.4 © 6386-8155 [employer]. Care instructions adapted under license by [employer] (which disclaims liability or warranty for this information). If you have questions about a medical condition or this instruction, always ask your healthcare professional. [address] any warranty or liability for your use of this information.

## 2020-04-17 NOTE — PROGRESS NOTES
Consent: Celi Lorenzana, who was seen by synchronous (real-time) audio-video technology, and/or her healthcare decision maker, is aware that this patient-initiated, Telehealth encounter on 4/17/2020 is a billable service, with coverage as determined by her insurance carrier. She is aware that she may receive a bill and has provided verbal consent to proceed: Yes. Assessment & Plan:   Diagnoses and all orders for this visit:    1. Mild persistent asthma without complication  stable  Resume ICS, continue through spring allergy season. Consider discontinuing during the summer if doing well, resuming in fall during her next typically symptomatic period  Albuterol prn  -     albuterol (PROVENTIL HFA, VENTOLIN HFA, PROAIR HFA) 90 mcg/actuation inhaler; Take 2 Puffs by inhalation every four (4) hours as needed for Wheezing for up to 360 days. -     fluticasone propionate (Flovent Diskus) 250 mcg/actuation dsdv; Take 1 Puff by inhalation two (2) times a day. 2. Seasonal allergic rhinitis due to pollen  Continue OTC antihistamine, allergen avoidance        Follow-up and Dispositions    · Return in about 6 months (around 10/17/2020), or if symptoms worsen or fail to improve. 712  Subjective:   Celi Lorenzana is a 42 y.o. female who was seen for Asthma    HPI:  Presents for f/u of asthma. Reports stopped flovent during her usual asymtomatic period, resumed a few weeks ago when spring allergy/pollen season began. Has not used rescue since February. No night time symptoms. Dx in 1989. Has never had PFTs. Reports no lifetime ED visits or hospitalizations for asthma. Taking OTC antihistamine for allergic rhinitis with good relief. Prior to Admission medications    Medication Sig Start Date End Date Taking? Authorizing Provider   albuterol (PROVENTIL HFA, VENTOLIN HFA, PROAIR HFA) 90 mcg/actuation inhaler Take 2 Puffs by inhalation every four (4) hours as needed for Wheezing for up to 360 days.  4/17/20 4/12/21 Yes Andrea Bruno NP   fluticasone propionate (Flovent Diskus) 250 mcg/actuation dsdv Take 1 Puff by inhalation two (2) times a day. 4/17/20  Yes Randal Willingham NP   methotrexate (RHEUMATREX) 2.5 mg tablet  1/2/20   Provider, Historical   folic acid (FOLVITE) 1 mg tablet  1/2/20   Provider, Historical   predniSONE (DELTASONE) 5 mg tablet  1/2/20   Provider, Historical   levonorgestreL (MIRENA) 20 mcg/24 hours (5 yrs) 52 mg IUD 1 Device by IntraUTERine route once. Provider, Historical   cholecalciferol (VITAMIN D3) (1000 Units /25 mcg) tablet Take  by mouth daily. Provider, Historical   acetaminophen (TYLENOL) 325 mg tablet Take  by mouth every four (4) hours as needed for Pain. Provider, Historical   multivitamin (ONE A DAY) tablet Take 1 Tab by mouth daily. Provider, Historical   albuterol (PROVENTIL HFA, VENTOLIN HFA, PROAIR HFA) 90 mcg/actuation inhaler Take 2 Puffs by inhalation every four (4) hours as needed for Wheezing for up to 360 days.  8/2/19 4/17/20  Randal Willingham NP     Allergies   Allergen Reactions    Iodine Rash    Shellfish Derived Rash       Patient Active Problem List   Diagnosis Code    Mild persistent asthma without complication V22.06     Allergies   Allergen Reactions    Iodine Rash    Shellfish Derived Rash     Past Medical History:   Diagnosis Date    Asthma     proair used as needed    Gestational diabetes     Gestational diabetes     History of ectopic pregnancy     Rheumatoid arthritis ([facility] [address] 12/2019     Past Surgical History:   Procedure Laterality Date    HX RIGHT SALPINGECTOMY       Family History   Problem Relation Age of Onset    Breast Cancer Paternal Grandmother     Arthritis Paternal Grandmother     Arthritis-rheumatoid Maternal Aunt      Social History     Tobacco Use    Smoking status: Former Smoker     Packs/day: 0.50     Types: Cigarettes    Smokeless tobacco: Never Used   Substance Use Topics    Alcohol use: Yes     Comment: rarely       Review of Systems   Constitutional: Negative for chills, fever and weight loss. HENT: Negative for congestion and sinus pain. Eyes: Negative for discharge and redness. Respiratory: Negative for cough, shortness of breath and wheezing. Cardiovascular: Negative for chest pain and palpitations. Gastrointestinal: Negative. Genitourinary: Negative. Musculoskeletal: Negative. Skin: Negative for rash. Neurological: Negative. Endo/Heme/Allergies: Negative. Psychiatric/Behavioral: Negative. Objective: There were no vitals taken for this visit. General: alert, cooperative, no distress   Mental  status: normal mood, behavior, speech, dress, motor activity, and thought processes, able to follow commands   HENT: NCAT   Neck: no visualized mass   Resp: no respiratory distress   Neuro: no gross deficits   Skin: no discoloration or lesions of concern on visible areas   Psychiatric: normal affect, consistent with stated mood, no evidence of hallucinations       We discussed the expected course, resolution and complications of the diagnosis(es) in detail. Medication risks, benefits, costs, interactions, and alternatives were discussed as indicated. I advised her to contact the office if her condition worsens, changes or fails to improve as anticipated. She expressed understanding with the diagnosis(es) and plan. Stanley Nash is a 42 y.o. female being evaluated by a video visit encounter for concerns as above. A caregiver was present when appropriate. Due to this being a TeleHealth encounter (During UZCMD-66 public health emergency), evaluation of the following organ systems was limited: Vitals/Constitutional/EENT/Resp/CV/GI//MS/Neuro/Skin/Heme-Lymph-Imm.   Pursuant to the emergency declaration under the [address] [place] [address], [address] [place] [address] and the [employer] and [employer] General Act, this Virtual  Visit was conducted, with patient's (and/or legal guardian's) consent, to reduce the patient's risk of exposure to COVID-19 and provide necessary medical care. Services were provided through a video synchronous discussion virtually to substitute for in-person clinic visit. Patient and provider were located at their individual homes.         Stephanie Patel NP  04/17/20

## 2020-05-05 ENCOUNTER — TELEPHONE (OUTPATIENT)
Dept: OBGYN CLINIC | Age: 40
End: 2020-05-05

## 2020-05-05 NOTE — TELEPHONE ENCOUNTER
Patient advised of MD recommendations and does not want to come in due to having RA and the current covid 19. Patient will call back later to set up appointment.     Patient verbalized understanding

## 2020-05-05 NOTE — TELEPHONE ENCOUNTER
Call received at 640am    42year old patient last seen in the office on 1/17/2020    Patient calling to say that she thinks she has genital warts just above the vaginal area in the pelvici region. Patient reports several are the size of peas. Patient reports having her symptoms for about a week. Patient denies vaginal discharge and reports some itching and mild irritation. Patient is wondering about a medication? ??      Please advise

## 2021-02-10 ENCOUNTER — OFFICE VISIT (OUTPATIENT)
Dept: OBGYN CLINIC | Age: 41
End: 2021-02-10
Payer: COMMERCIAL

## 2021-02-10 VITALS
BODY MASS INDEX: 24.63 KG/M2 | DIASTOLIC BLOOD PRESSURE: 60 MMHG | WEIGHT: 139 LBS | HEIGHT: 63 IN | SYSTOLIC BLOOD PRESSURE: 102 MMHG

## 2021-02-10 DIAGNOSIS — Z01.419 WELL WOMAN EXAM WITH ROUTINE GYNECOLOGICAL EXAM: Primary | ICD-10-CM

## 2021-02-10 DIAGNOSIS — Z01.419 WELL WOMAN EXAM WITH ROUTINE GYNECOLOGICAL EXAM: ICD-10-CM

## 2021-02-10 DIAGNOSIS — R87.810 CERVICAL HIGH RISK HPV (HUMAN PAPILLOMAVIRUS) TEST POSITIVE: ICD-10-CM

## 2021-02-10 PROCEDURE — 99395 PREV VISIT EST AGE 18-39: CPT | Performed by: OBSTETRICS & GYNECOLOGY

## 2021-02-10 RX ORDER — ABATACEPT 125 MG/ML
INJECTION, SOLUTION SUBCUTANEOUS
COMMUNITY
Start: 2021-01-13

## 2021-02-10 NOTE — PROGRESS NOTES
Eevrardo Tyson is a ,  47 y.o. female [facility] whose No LMP recorded. (Menstrual status: IUD). was on  who presents for her annual checkup. She is having no significant problems. With regard to the Gardisil vaccine, she is older than the FDA approved age to receive it. Menstrual status:    Her periods are light with IUD    She denies dysmenorrhea. She reports no premenstrual symptoms. Contraception:    The current method of family planning is Mirena IUD (placed 17)    Sexual history:    She  reports being sexually active and has had partner(s) who are Male. She reports using the following method of birth control/protection: I.U.D..    Medical conditions:    Since her last annual GYN exam about one year ago, she has not the following changes in her health history: none. Pap and Mammogram History:    Her most recent Pap smear was abnormal (+HPV only) obtained 1 year(s) ago. The patient has never had a mammogram.    The patient does not have a family history of breast cancer. Past Medical History:   Diagnosis Date    Asthma     proair used as needed    Gestational diabetes     Gestational diabetes     History of ectopic pregnancy     Rheumatoid arthritis ([facility] [address] 2019     Past Surgical History:   Procedure Laterality Date    HX RIGHT SALPINGECTOMY         Current Outpatient Medications   Medication Sig Dispense Refill    Orencia ClickJect 324 mg/mL atIn       methotrexate (RHEUMATREX) 2.5 mg tablet       folic acid (FOLVITE) 1 mg tablet       levonorgestreL (MIRENA) 20 mcg/24 hours (5 yrs) 52 mg IUD 1 Device by IntraUTERine route once.  cholecalciferol (VITAMIN D3) (1000 Units /25 mcg) tablet Take  by mouth daily.  acetaminophen (TYLENOL) 325 mg tablet Take  by mouth every four (4) hours as needed for Pain.  multivitamin (ONE A DAY) tablet Take 1 Tab by mouth daily.       albuterol (PROVENTIL HFA, VENTOLIN HFA, PROAIR HFA) 90 mcg/actuation inhaler Take 2 Puffs by inhalation every four (4) hours as needed for Wheezing for up to 360 days. 1 Inhaler 5    fluticasone propionate (Flovent Diskus) 250 mcg/actuation dsdv Take 1 Puff by inhalation two (2) times a day. 1 Inhaler 5    predniSONE (DELTASONE) 5 mg tablet        Allergies: Iodine and Shellfish derived   Social History     Socioeconomic History    Marital status:      Spouse name: Not on file    Number of children: Not on file    Years of education: Not on file    Highest education level: Not on file   Occupational History    Not on file   Social Needs    Financial resource strain: Not on file    Food insecurity     Worry: Not on file     Inability: Not on file    Transportation needs     Medical: Not on file     Non-medical: Not on file   Tobacco Use    Smoking status: Former Smoker     Packs/day: 0.50     Types: Cigarettes    Smokeless tobacco: Never Used   Substance and Sexual Activity    Alcohol use: Yes     Comment: rarely    Drug use: No    Sexual activity: Yes     Partners: Male     Birth control/protection: I.U.D. Lifestyle    Physical activity     Days per week: Not on file     Minutes per session: Not on file    Stress: Not on file   Relationships    Social connections     Talks on phone: Not on file     Gets together: Not on file     Attends Yarsanism service: Not on file     Active member of club or organization: Not on file     Attends meetings of clubs or organizations: Not on file     Relationship status: Not on file    Intimate partner violence     Fear of current or ex partner: Not on file     Emotionally abused: Not on file     Physically abused: Not on file     Forced sexual activity: Not on file   Other Topics Concern    Not on file   Social History Narrative    Not on file     Tobacco History:  reports that she has quit smoking. Her smoking use included cigarettes. She smoked 0.50 packs per day.  She has never used smokeless tobacco.  Alcohol Abuse:  reports current alcohol use. Drug Abuse:  reports no history of drug use.     Patient Active Problem List   Diagnosis Code    Mild persistent asthma without complication J91.89    Inflammatory polyarthritis ([facility] M06.4       Review of Systems - History obtained from the patient  Constitutional: negative for weight loss, fever, night sweats  HEENT: negative for hearing loss, earache, congestion, snoring, sorethroat  CV: negative for chest pain, palpitations, edema  Resp: negative for cough, shortness of breath, wheezing  GI: negative for change in bowel habits, abdominal pain, black or bloody stools  : negative for frequency, dysuria, hematuria, vaginal discharge  MSK: negative for back pain, joint pain, muscle pain  Breast: negative for breast lumps, nipple discharge, galactorrhea  Skin :negative for itching, rash, hives  Neuro: negative for dizziness, headache, confusion, weakness  Psych: negative for anxiety, depression, change in mood  Heme/lymph: negative for bleeding, bruising, pallor    Physical Exam    Visit Vitals  /60   Ht 5' 3\" (1.6 m)   Wt 139 lb (63 kg)   Breastfeeding No   BMI 24.62 kg/m²       Constitutional  · Appearance: well-nourished, well developed, alert, in no acute distress    HENT  · Head and Face: appears normal    Neck  · Inspection/Palpation: normal appearance, no masses or tenderness  · Lymph Nodes: no lymphadenopathy present    Chest  · Respiratory Effort: breathing normal    Breasts  · Inspection of Breasts: breasts symmetrical, no skin changes, no discharge present, nipple appearance normal, no skin retraction present  · Palpation of Breasts and Axillae: no masses present on palpation, no breast tenderness  · Axillary Lymph Nodes: no lymphadenopathy present    Gastrointestinal  · Abdominal Examination: abdomen non-tender to palpation, normal bowel sounds, no masses present  · Liver and spleen: no hepatomegaly present, spleen not palpable  · Hernias: no hernias identified    Genitourinary  · External Genitalia: normal appearance for age, no discharge present, no tenderness present, no inflammatory lesions present, no masses present, no atrophy present  · Vagina: normal vaginal vault without central or paravaginal defects, no discharge present, no inflammatory lesions present, no masses present  · Bladder: non-tender to palpation  · Urethra: appears normal  · Cervix: normal, IUD string seen   · Uterus: normal size, shape and consistency  · Adnexa: no adnexal tenderness present, no adnexal masses present  · Perineum: perineum within normal limits, no evidence of trauma, no rashes or skin lesions present  · Anus: anus within normal limits, no hemorrhoids present  · Inguinal Lymph Nodes: no lymphadenopathy present    Skin  · General Inspection: no rash, no lesions identified    Neurologic/Psychiatric  · Mental Status:  · Orientation: grossly oriented to person, place and time  · Mood and Affect: mood normal, affect appropriate    . Assessment:  Routine gynecologic examination  Her current medical status is satisfactory with no evidence of significant gynecologic issues.     Plan:  Counseled re: diet, exercise, healthy lifestyle  Return for yearly wellness visits

## 2021-02-19 ENCOUNTER — TELEPHONE (OUTPATIENT)
Dept: OBGYN CLINIC | Age: 41
End: 2021-02-19

## 2021-02-19 NOTE — TELEPHONE ENCOUNTER
Call received at 3:50-pm     47year old patient last seen in the office on  2/20/2021  Patient calling about her recent pap smear results    Patient advised that the pap smear has not been resulted for MD to review    Patient verbalized understanding.

## 2021-02-22 LAB
CYTOLOGIST CVX/VAG CYTO: ABNORMAL
CYTOLOGY CVX/VAG DOC CYTO: ABNORMAL
CYTOLOGY CVX/VAG DOC THIN PREP: ABNORMAL
CYTOLOGY HISTORY:: ABNORMAL
DX ICD CODE: ABNORMAL
DX ICD CODE: ABNORMAL
HPV I/H RISK 4 DNA CVX QL PROBE+SIG AMP: NEGATIVE
Lab: ABNORMAL
OTHER STN SPEC: ABNORMAL
PATHOLOGIST CVX/VAG CYTO: ABNORMAL
STAT OF ADQ CVX/VAG CYTO-IMP: ABNORMAL

## 2021-02-22 NOTE — PROGRESS NOTES
Your Pap is not definitely normal or abnormal. It is in the gray area of \"atypical cells of undetermined significance\". The HPV test is negative so it is technically considered a normal Pap. It also shows yeast which may be the reason why it is not definitely normal. Yeast can sometimes cause some inflammation on the Pap and makes the cells look less than perfectly normal under the microscope. You can use an over the counter yeast medicine or call us if you want a prescription medicine. You should have a repeat Pap in one year instead of waiting 3 years.

## 2022-03-18 PROBLEM — J45.30 MILD PERSISTENT ASTHMA WITHOUT COMPLICATION: Status: ACTIVE | Noted: 2019-08-18

## 2022-03-19 PROBLEM — M06.4 INFLAMMATORY POLYARTHRITIS (HCC): Status: ACTIVE | Noted: 2020-04-17

## 2022-10-17 ENCOUNTER — OFFICE VISIT (OUTPATIENT)
Dept: OBGYN CLINIC | Age: 42
End: 2022-10-17
Payer: COMMERCIAL

## 2022-10-17 VITALS — SYSTOLIC BLOOD PRESSURE: 124 MMHG | DIASTOLIC BLOOD PRESSURE: 85 MMHG | BODY MASS INDEX: 21.97 KG/M2 | WEIGHT: 124 LBS

## 2022-10-17 DIAGNOSIS — Z01.419 WELL WOMAN EXAM WITH ROUTINE GYNECOLOGICAL EXAM: ICD-10-CM

## 2022-10-17 DIAGNOSIS — Z01.419 WELL WOMAN EXAM WITH ROUTINE GYNECOLOGICAL EXAM: Primary | ICD-10-CM

## 2022-10-17 DIAGNOSIS — N89.8 VAGINAL ODOR: ICD-10-CM

## 2022-10-17 PROCEDURE — 99396 PREV VISIT EST AGE 40-64: CPT | Performed by: OBSTETRICS & GYNECOLOGY

## 2022-10-17 NOTE — PROGRESS NOTES
Eben Blas is a ,  38 y.o. female [address] [facility] whose LMP was on 10/3/2022 who presents for her annual checkup. She is having  c/o recurring yeast infections . She currently has a vaginal odor. Denies discharge, burning, or itching. She states she has tried Monistat 3 and 7 about 1-2 months ago. Menstrual status:    Her periods are light in flow. She is using one to two pads or tampons per day, usually irregular lasting 2 to 4 days. She denies dysmenorrhea. She reports no premenstrual symptoms. The patient is not using HRT. Contraception:    The current method of family planning is IUD. Sexual history:    She  reports being sexually active and has had partner(s) who are male. She reports using the following method of birth control/protection: I.U.D..    Medical conditions:    Since her last annual GYN exam about one year ago, she has had the following changes in her health history: none. Pap and Mammogram History:    Her most recent Pap smear was normal obtained 1 year(s) ago on 2/10/21. The patient has never had a mammogram.    Breast Cancer History/Substance Abuse:    She has no family history of breast cancer. Osteoporosis History:    Family history does not include a first or second degree relative with osteopenia or osteoporosis. A bone density scan has not been previously obtained. Past Medical History:   Diagnosis Date    Asthma     proair used as needed    Gestational diabetes     Gestational diabetes     History of ectopic pregnancy     Rheumatoid arthritis ([facility] [address] 2019     Past Surgical History:   Procedure Laterality Date    HX RIGHT SALPINGECTOMY       Current Outpatient Medications   Medication Sig Dispense Refill    levonorgestreL (MIRENA) 20 mcg/24 hours (8 yrs) 52 mg IUD 1 Device by IntraUTERine route once.       acetaminophen (TYLENOL) 325 mg tablet Take  by mouth every four (4) hours as needed for Pain.      multivitamin (ONE A DAY) tablet Take 1 Tab by mouth daily. Orencia ClickJect 860 mg/mL atIn  (Patient not taking: Reported on 10/17/2022)      fluticasone propionate (Flovent Diskus) 250 mcg/actuation dsdv Take 1 Puff by inhalation two (2) times a day. (Patient not taking: Reported on 10/17/2022) 1 Inhaler 5    methotrexate (RHEUMATREX) 2.5 mg tablet  (Patient not taking: Reported on 35/62/2765)      folic acid (FOLVITE) 1 mg tablet  (Patient not taking: Reported on 10/17/2022)      predniSONE (DELTASONE) 5 mg tablet  (Patient not taking: Reported on 10/17/2022)      cholecalciferol (VITAMIN D3) (1000 Units /25 mcg) tablet Take  by mouth daily. (Patient not taking: Reported on 10/17/2022)       Allergies: Iodine and Shellfish derived   Social History     Socioeconomic History    Marital status:      Spouse name: Not on file    Number of children: Not on file    Years of education: Not on file    Highest education level: Not on file   Occupational History    Not on file   Tobacco Use    Smoking status: Former     Packs/day: 0.50     Types: Cigarettes    Smokeless tobacco: Never   Substance and Sexual Activity    Alcohol use: Yes     Comment: rarely    Drug use: No    Sexual activity: Yes     Partners: Male     Birth control/protection: I.U.D. Other Topics Concern    Not on file   Social History Narrative    Not on file     Social Determinants of Health     Financial Resource Strain: Not on file   Food Insecurity: Not on file   Transportation Needs: Not on file   Physical Activity: Not on file   Stress: Not on file   Social Connections: Not on file   Intimate Partner Violence: Not on file   Housing Stability: Not on file     Tobacco History:  reports that she has quit smoking. Her smoking use included cigarettes. She smoked an average of .5 packs per day. She has never used smokeless tobacco.  Alcohol Abuse:  reports current alcohol use. Drug Abuse:  reports no history of drug use.   Patient Active Problem List   Diagnosis Code    Mild persistent asthma without complication F92.17    Inflammatory polyarthritis ([facility] [address] M06.4         Review of Systems - History obtained from the patient  Constitutional: negative for weight loss, fever, night sweats  HEENT: negative for hearing loss, earache, congestion, snoring, sorethroat  CV: negative for chest pain, palpitations, edema  Resp: negative for cough, shortness of breath, wheezing  GI: negative for change in bowel habits, abdominal pain, black or bloody stools  : negative for frequency, dysuria, hematuria, vaginal discharge  MSK: negative for back pain, joint pain, muscle pain  Breast: negative for breast lumps, nipple discharge, galactorrhea  Skin :negative for itching, rash, hives  Neuro: negative for dizziness, headache, confusion, weakness  Psych: negative for anxiety, depression, change in mood  Heme/lymph: negative for bleeding, bruising, pallor    Physical Exam    Visit Vitals  /85   Wt 124 lb (56.2 kg)   LMP 10/03/2022 (Approximate)   BMI 21.97 kg/m²     Constitutional  Appearance: well-nourished, well developed, alert, in no acute distress    HENT  Head and Face: appears normal    Neck  Inspection/Palpation: normal appearance, no masses or tenderness  Lymph Nodes: no lymphadenopathy present    Chest  Respiratory Effort: breathing normal    Breasts  Inspection of Breasts: breasts symmetrical, no skin changes, no discharge present, nipple appearance normal, no skin retraction present  Palpation of Breasts and Axillae: no masses present on palpation, no breast tenderness  Axillary Lymph Nodes: no lymphadenopathy present    Gastrointestinal  Abdominal Examination: abdomen non-tender to palpation, normal bowel sounds, no masses present  Liver and spleen: no hepatomegaly present, spleen not palpable  Hernias: no hernias identified    Skin  General Inspection: no rash, no lesions identified    Neurologic/Psychiatric  Mental Status:  Orientation: grossly oriented to person, place and time  Mood and Affect: mood normal, affect appropriate    Genitourinary  External Genitalia: normal appearance for age, no discharge present, no tenderness present, no inflammatory lesions present, no masses present, no atrophy present  Vagina: normal vaginal vault without central or paravaginal defects, white discharge present, no inflammatory lesions present, no masses present  Bladder: non-tender to palpation  Urethra: appears normal  Cervix: normal; IUD string seen   Uterus: normal size, shape and consistency  Adnexa: no adnexal tenderness present, no adnexal masses present  Perineum: perineum within normal limits, no evidence of trauma, no rashes or skin lesions present  Anus: anus within normal limits, no hemorrhoids present  Inguinal Lymph Nodes: no lymphadenopathy present    Assessment:  Routine gynecologic examination  Her current medical status is satisfactory with no evidence of significant gynecologic issues.     Plan:  Counseled re: diet, exercise, healthy lifestyle  Return for yearly wellness visits  Rec annual mammogram  Patient Verbalized understanding  Pap and Nuswab went

## 2022-10-20 LAB
A VAGINAE DNA VAG QL NAA+PROBE: ABNORMAL SCORE
BVAB2 DNA VAG QL NAA+PROBE: ABNORMAL SCORE
C ALBICANS DNA VAG QL NAA+PROBE: NEGATIVE
C GLABRATA DNA VAG QL NAA+PROBE: NEGATIVE
MEGA1 DNA VAG QL NAA+PROBE: ABNORMAL SCORE

## 2022-10-21 LAB
CYTOLOGIST CVX/VAG CYTO: NORMAL
CYTOLOGY CVX/VAG DOC CYTO: NORMAL
CYTOLOGY CVX/VAG DOC THIN PREP: NORMAL
CYTOLOGY HISTORY:: NORMAL
DX ICD CODE: NORMAL
HPV I/H RISK 4 DNA CVX QL PROBE+SIG AMP: NEGATIVE
Lab: NORMAL
OTHER STN SPEC: NORMAL
STAT OF ADQ CVX/VAG CYTO-IMP: NORMAL

## 2022-10-21 RX ORDER — METRONIDAZOLE 500 MG/1
500 TABLET ORAL 2 TIMES DAILY
Qty: 14 TABLET | Refills: 0 | Status: SHIPPED | OUTPATIENT
Start: 2022-10-21 | End: 2022-10-28

## 2022-10-21 NOTE — PROGRESS NOTES
The vaginal culture went at your last visit shows that you have an overgrowth of normal bacteria called Bacterial Vaginosis. I will send a prescription to your pharmacy to treat it.

## 2022-11-17 NOTE — PROGRESS NOTES
Seeing DTC on Friday and endocrine the following week Pt fell backwards yesterday. + head injury. Denies LOC. GCS 15. Not on AC. C/o head, back, and bilateral leg pain

## 2023-05-19 RX ORDER — FOLIC ACID 1 MG/1
TABLET ORAL
COMMUNITY
Start: 2020-01-02

## 2023-05-19 RX ORDER — ABATACEPT 125 MG/ML
INJECTION, SOLUTION SUBCUTANEOUS
COMMUNITY
Start: 2021-01-13

## 2023-05-19 RX ORDER — ACETAMINOPHEN 325 MG/1
TABLET ORAL EVERY 4 HOURS PRN
COMMUNITY

## 2023-05-19 RX ORDER — FLUTICASONE PROPIONATE 250 UG/1
1 POWDER, METERED RESPIRATORY (INHALATION) 2 TIMES DAILY
COMMUNITY
Start: 2020-04-17

## 2023-05-19 RX ORDER — PREDNISONE 5 MG/1
TABLET ORAL
COMMUNITY
Start: 2020-01-02

## 2024-12-27 NOTE — TELEPHONE ENCOUNTER
Call to patient.   She notes that her thyroid is doing really good and given that she still has the palpitations and feels like her legs and arms are go numb at times. Sh questions if she has a circulation problem. She was seen by cardiology back in 2017 who assured her things were find but would like to be evaluated at this time .She wonders if she has some bad circulation. Denied CP, SOB. Denies weakness, headaches or vision changes.   Symptoms are more present at night or when sitting for long periods of time.   Blood pressures have been good.     Pt was advised to seek care or call back if new or worsening symptoms. She is agreeable and understanding    Transferred to reception to schedule.      Returned call. No answer, left voicemail to return call.